# Patient Record
Sex: MALE | Race: BLACK OR AFRICAN AMERICAN | NOT HISPANIC OR LATINO | Employment: UNEMPLOYED | URBAN - METROPOLITAN AREA
[De-identification: names, ages, dates, MRNs, and addresses within clinical notes are randomized per-mention and may not be internally consistent; named-entity substitution may affect disease eponyms.]

---

## 2017-07-20 ENCOUNTER — HOSPITAL ENCOUNTER (EMERGENCY)
Facility: HOSPITAL | Age: 32
Discharge: HOME | End: 2017-07-20
Attending: EMERGENCY MEDICINE
Payer: COMMERCIAL

## 2017-07-20 VITALS
DIASTOLIC BLOOD PRESSURE: 72 MMHG | HEIGHT: 73 IN | OXYGEN SATURATION: 98 % | WEIGHT: 225 LBS | TEMPERATURE: 98 F | RESPIRATION RATE: 16 BRPM | HEART RATE: 86 BPM | SYSTOLIC BLOOD PRESSURE: 115 MMHG | BODY MASS INDEX: 29.82 KG/M2
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
## 2017-07-20 DIAGNOSIS — S80.01XA CONTUSION OF RIGHT KNEE, INITIAL ENCOUNTER: Primary | ICD-10-CM

## 2017-07-20 PROCEDURE — 99284 EMERGENCY DEPT VISIT MOD MDM: CPT | Mod: 25

## 2017-07-20 PROCEDURE — 25000003 PHARM REV CODE 250: Performed by: NURSE PRACTITIONER

## 2017-07-20 RX ORDER — NAPROXEN 500 MG/1
500 TABLET ORAL
Status: COMPLETED | OUTPATIENT
Start: 2017-07-20 | End: 2017-07-20

## 2017-07-20 RX ORDER — NAPROXEN 500 MG/1
500 TABLET ORAL 2 TIMES DAILY PRN
Qty: 10 TABLET | Refills: 0 | Status: SHIPPED | OUTPATIENT
Start: 2017-07-20 | End: 2017-07-25

## 2017-07-20 RX ORDER — METHOCARBAMOL 500 MG/1
1000 TABLET, FILM COATED ORAL 3 TIMES DAILY PRN
Qty: 18 TABLET | Refills: 0 | OUTPATIENT
Start: 2017-07-20 | End: 2018-12-11

## 2017-07-20 RX ADMIN — NAPROXEN 500 MG: 500 TABLET ORAL at 01:07

## 2017-07-20 NOTE — DISCHARGE INSTRUCTIONS
Please return to the Emergency Department for any new or worsening symptoms including: worsening knee pain, fever, chest pain, shortness of breath, loss of consciousness, dizziness, weakness, or any other concerns.     Please follow up with your Primary Care Provider within in the week. If you do not have a Primary Care Provider, you may contact the one listed on your discharge paperwork or you may also call the [facility] Appointment Desk at [phone] to schedule an appointment with a Primary Care Provider.     Please take all medication as prescribed. You have been prescribed Robaxin for pain. Please do not take this medication while working, drinking alcohol, swimming, or while driving/operating heavy machinery. This medication may cause drowsiness, impair judgment, and reduce physical capabilities.    You have been prescribed Naproxen for pain. This is an Non-Steroidal Anti-Inflammatory (NSAID) Medication. Please do not take any additional NSAIDs while you are taking this medication including (Advil, Aleve, Motrin, Ibuprofen, Mobic\meloxicam, Naprosyn, etc.). Please stop taking this medication if you experience: weakness, itching, yellow skin or eyes, joint pains, vomiting blood, blood or black stools, unusual weight gain, or swelling in your arms, legs, hands, or feet.     Rest, Ice, Elevate the knee to help with pain.
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
## 2018-03-22 ENCOUNTER — HOSPITAL ENCOUNTER (EMERGENCY)
Facility: HOSPITAL | Age: 33
Discharge: HOME | End: 2018-03-22
Attending: EMERGENCY MEDICINE
Payer: MEDICAID

## 2018-03-22 VITALS
WEIGHT: 225 LBS | OXYGEN SATURATION: 100 % | SYSTOLIC BLOOD PRESSURE: 124 MMHG | TEMPERATURE: 98 F | BODY MASS INDEX: 29.82 KG/M2 | RESPIRATION RATE: 16 BRPM | DIASTOLIC BLOOD PRESSURE: 86 MMHG | HEART RATE: 86 BPM | HEIGHT: 73 IN

## 2018-03-22 DIAGNOSIS — J06.9 VIRAL URI WITH COUGH: Primary | ICD-10-CM

## 2018-03-22 DIAGNOSIS — J11.1 INFLUENZA-LIKE ILLNESS: ICD-10-CM

## 2018-03-22 PROCEDURE — 25000003 PHARM REV CODE 250: Performed by: PHYSICIAN ASSISTANT

## 2018-03-22 PROCEDURE — 99283 EMERGENCY DEPT VISIT LOW MDM: CPT

## 2018-03-22 RX ORDER — OSELTAMIVIR PHOSPHATE 75 MG/1
75 CAPSULE ORAL 2 TIMES DAILY
Qty: 10 CAPSULE | Refills: 0 | Status: SHIPPED | OUTPATIENT
Start: 2018-03-22 | End: 2018-03-22

## 2018-03-22 RX ORDER — BENZONATATE 100 MG/1
100 CAPSULE ORAL 3 TIMES DAILY PRN
Qty: 20 CAPSULE | Refills: 0 | Status: SHIPPED | OUTPATIENT
Start: 2018-03-22 | End: 2018-04-01

## 2018-03-22 RX ORDER — OSELTAMIVIR PHOSPHATE 75 MG/1
75 CAPSULE ORAL 2 TIMES DAILY
Qty: 10 CAPSULE | Refills: 0 | Status: SHIPPED | OUTPATIENT
Start: 2018-03-22 | End: 2018-03-27

## 2018-03-22 RX ORDER — IBUPROFEN 600 MG/1
600 TABLET ORAL EVERY 6 HOURS PRN
Qty: 20 TABLET | Refills: 0 | OUTPATIENT
Start: 2018-03-22 | End: 2018-12-11

## 2018-03-22 RX ORDER — BENZONATATE 100 MG/1
100 CAPSULE ORAL 3 TIMES DAILY PRN
Qty: 20 CAPSULE | Refills: 0 | Status: SHIPPED | OUTPATIENT
Start: 2018-03-22 | End: 2018-03-22

## 2018-03-22 RX ORDER — IBUPROFEN 600 MG/1
600 TABLET ORAL EVERY 6 HOURS PRN
Qty: 20 TABLET | Refills: 0 | Status: SHIPPED | OUTPATIENT
Start: 2018-03-22 | End: 2018-03-22

## 2018-03-22 RX ORDER — BENZONATATE 100 MG/1
100 CAPSULE ORAL
Status: COMPLETED | OUTPATIENT
Start: 2018-03-22 | End: 2018-03-22

## 2018-03-22 RX ORDER — IBUPROFEN 600 MG/1
600 TABLET ORAL
Status: COMPLETED | OUTPATIENT
Start: 2018-03-22 | End: 2018-03-22

## 2018-03-22 RX ADMIN — IBUPROFEN 600 MG: 600 TABLET, FILM COATED ORAL at 06:03

## 2018-03-22 RX ADMIN — BENZONATATE 100 MG: 100 CAPSULE ORAL at 06:03

## 2018-03-22 NOTE — ED PROVIDER NOTES
Encounter Date: 3/22/2018       History     Chief Complaint   Patient presents with    Headache     Headache and middle to lower back pain x 2 days.    Back Pain     33 y/o male with no medical hx presents with cough, rhinorrhea, headache, and myalgias x2-3 days. He explains it started with a mild cough. He also reports feeling chills at times, but has not taken his temp. His headache is frontal, constant, and non-radiating. He denies neck stiffness, vision changes, N/V, SOB, or CP.           Review of patient's allergies indicates:  No Known Allergies  History reviewed. No pertinent past medical history.  No past surgical history on file.  Family History   Problem Relation Age of Onset    COPD Neg Hx     Drug abuse Neg Hx     Hyperlipidemia Neg Hx     Mental retardation Neg Hx      Social History   Substance Use Topics    Smoking status: Current Every Day Smoker     Packs/day: 0.25     Types: Cigarettes    Smokeless tobacco: Never Used    Alcohol use Yes      Comment: sometimes     Review of Systems   Constitutional: Positive for chills. Negative for fever.   HENT: Positive for rhinorrhea. Negative for sore throat.    Eyes: Negative for photophobia and visual disturbance.   Respiratory: Positive for cough. Negative for shortness of breath.    Cardiovascular: Negative for chest pain.   Gastrointestinal: Negative for nausea.   Genitourinary: Negative for dysuria.   Musculoskeletal: Positive for myalgias. Negative for back pain.   Skin: Negative for rash.   Neurological: Positive for headaches. Negative for weakness.   Hematological: Does not bruise/bleed easily.       Physical Exam     Initial Vitals [03/22/18 1735]   BP Pulse Resp Temp SpO2   (!) 143/95 81 18 98.1 °F (36.7 °C) 97 %      MAP       111         Physical Exam    Vitals reviewed.  Constitutional: He appears well-developed and well-nourished. He is not diaphoretic. No distress.   HENT:   Head: Normocephalic and atraumatic.   Right Ear: External  ear normal.   Left Ear: External ear normal.   Nose: Nose normal.   Mouth/Throat: Oropharynx is clear and moist. No oropharyngeal exudate.   Eyes: Conjunctivae are normal. No scleral icterus.   Neck: Normal range of motion. Neck supple. No JVD present.   Cardiovascular: Normal rate, regular rhythm, normal heart sounds and intact distal pulses.   Pulmonary/Chest: Breath sounds normal. No respiratory distress. He has no wheezes. He has no rhonchi. He has no rales. He exhibits no tenderness.   Abdominal: Soft. He exhibits no distension and no mass. There is no tenderness. There is no rebound and no guarding.   Musculoskeletal: Normal range of motion.   Lymphadenopathy:     He has no cervical adenopathy.   Neurological: He is alert and oriented to person, place, and time.   Skin: Skin is warm and dry. No rash noted. No erythema.         ED Course   Procedures  Labs Reviewed - No data to display          Medical Decision Making:   Initial Assessment:   31 y/o male with URI symptoms x2 days. No CP, SOB, neck stiffness. He presents well appearing, afebrile, with clear lungs. No meningeal signs.   Differential Diagnosis:   Viral URI, allergic rhinitis, and also considered but less likely pneumonia, bronchitis, sepsis.  ED Management:  Pt treated with supportive care and offered Tamiflu with risks and benefits explained. He verbalized understanding and agreed with plan. Return precautions given.               Attending Attestation:     Physician Attestation Statement for NP/PA:   I discussed this assessment and plan of this patient with the NP/PA, but I did not personally examine the patient. The face to face encounter was performed by the NP/PA.                     Clinical Impression:   The primary encounter diagnosis was Viral URI with cough. A diagnosis of Influenza-like illness was also pertinent to this visit.                           Tiburcio Carroll PA-C  03/22/18 3774

## 2018-12-10 ENCOUNTER — HOSPITAL ENCOUNTER (EMERGENCY)
Facility: HOSPITAL | Age: 33
Discharge: HOME | End: 2018-12-11
Attending: EMERGENCY MEDICINE
Payer: MEDICAID

## 2018-12-10 DIAGNOSIS — K04.7 DENTAL ABSCESS: Primary | ICD-10-CM

## 2018-12-10 PROCEDURE — 99284 EMERGENCY DEPT VISIT MOD MDM: CPT

## 2018-12-10 PROCEDURE — 25000003 PHARM REV CODE 250: Performed by: NURSE PRACTITIONER

## 2018-12-10 PROCEDURE — S0077 INJECTION, CLINDAMYCIN PHOSP: HCPCS | Performed by: NURSE PRACTITIONER

## 2018-12-10 PROCEDURE — 96372 THER/PROPH/DIAG INJ SC/IM: CPT | Mod: 25

## 2018-12-10 RX ORDER — CLINDAMYCIN PHOSPHATE 150 MG/ML
600 INJECTION, SOLUTION INTRAVENOUS
Status: COMPLETED | OUTPATIENT
Start: 2018-12-10 | End: 2018-12-10

## 2018-12-10 RX ORDER — IBUPROFEN 400 MG/1
800 TABLET ORAL
Status: COMPLETED | OUTPATIENT
Start: 2018-12-10 | End: 2018-12-10

## 2018-12-10 RX ORDER — CLINDAMYCIN HYDROCHLORIDE 150 MG/1
300 CAPSULE ORAL 4 TIMES DAILY
Qty: 56 CAPSULE | Refills: 0 | Status: SHIPPED | OUTPATIENT
Start: 2018-12-10 | End: 2018-12-11 | Stop reason: SDUPTHER

## 2018-12-10 RX ADMIN — CLINDAMYCIN PHOSPHATE 600 MG: 150 INJECTION, SOLUTION INTRAMUSCULAR; INTRAVENOUS at 11:12

## 2018-12-10 RX ADMIN — IBUPROFEN 800 MG: 400 TABLET, FILM COATED ORAL at 11:12

## 2018-12-11 VITALS
OXYGEN SATURATION: 100 % | WEIGHT: 230 LBS | DIASTOLIC BLOOD PRESSURE: 81 MMHG | TEMPERATURE: 98 F | HEART RATE: 70 BPM | SYSTOLIC BLOOD PRESSURE: 131 MMHG | RESPIRATION RATE: 18 BRPM | BODY MASS INDEX: 30.48 KG/M2 | HEIGHT: 73 IN

## 2018-12-11 RX ORDER — IBUPROFEN 800 MG/1
800 TABLET ORAL EVERY 6 HOURS PRN
Qty: 20 TABLET | Refills: 0 | Status: SHIPPED | OUTPATIENT
Start: 2018-12-11 | End: 2019-10-16

## 2018-12-11 RX ORDER — CHLORHEXIDINE GLUCONATE ORAL RINSE 1.2 MG/ML
15 SOLUTION DENTAL 2 TIMES DAILY
Qty: 420 ML | Refills: 0 | Status: SHIPPED | OUTPATIENT
Start: 2018-12-11 | End: 2018-12-25

## 2018-12-11 RX ORDER — CLINDAMYCIN HYDROCHLORIDE 150 MG/1
300 CAPSULE ORAL 4 TIMES DAILY
Qty: 56 CAPSULE | Refills: 0 | Status: SHIPPED | OUTPATIENT
Start: 2018-12-11 | End: 2018-12-18

## 2018-12-11 NOTE — ED PROVIDER NOTES
Encounter Date: 12/10/2018       History     Chief Complaint   Patient presents with    Dental Pain     Pt reports back teeth on both top and bottom of R side of mouth hurt. Pt says pain for 2 days.     33-year-old male which presents with right upper dental pain that began 3 days ago.  Patient states that his filling fell out and he has not contacted the dentist.      The history is provided by the patient.     Review of patient's allergies indicates:  No Known Allergies  No past medical history on file.  No past surgical history on file.  Family History   Problem Relation Age of Onset    COPD Neg Hx     Drug abuse Neg Hx     Hyperlipidemia Neg Hx     Mental retardation Neg Hx      Social History     Tobacco Use    Smoking status: Current Every Day Smoker     Packs/day: 0.25     Types: Cigarettes    Smokeless tobacco: Never Used   Substance Use Topics    Alcohol use: Yes     Comment: sometimes    Drug use: No     Review of Systems   Constitutional: Negative for chills and fever.   HENT: Positive for dental problem and ear pain. Negative for sore throat.    Respiratory: Negative for chest tightness and shortness of breath.    Cardiovascular: Negative for chest pain.   Gastrointestinal: Negative for abdominal pain, nausea and vomiting.   Genitourinary: Negative for dysuria.   Musculoskeletal: Negative for back pain and myalgias.   Skin: Negative for color change and rash.   Neurological: Negative for dizziness, weakness, light-headedness and headaches.   Hematological: Does not bruise/bleed easily.   All other systems reviewed and are negative.      Physical Exam     Initial Vitals [12/10/18 2300]   BP Pulse Resp Temp SpO2   (!) 130/92 66 18 97.7 °F (36.5 °C) 99 %      MAP       --         Physical Exam    Nursing note and vitals reviewed.  Constitutional: He appears well-developed and well-nourished.   HENT:   Head: Normocephalic and atraumatic.   Right Ear: External ear normal.   Left Ear: External ear  normal.   Nose: Nose normal.   Mouth/Throat: Uvula is midline and oropharynx is clear and moist. No trismus in the jaw. Abnormal dentition. Dental abscesses and dental caries present.       Eyes: Conjunctivae and EOM are normal. Pupils are equal, round, and reactive to light.   Neck: Normal range of motion. No tracheal deviation present.   Cardiovascular: Normal rate, regular rhythm, normal heart sounds and intact distal pulses. Exam reveals no gallop and no friction rub.    No murmur heard.  Pulmonary/Chest: Breath sounds normal. No respiratory distress. He has no wheezes. He has no rhonchi. He has no rales. He exhibits no tenderness.   Musculoskeletal: Normal range of motion. He exhibits no edema or tenderness.   Lymphadenopathy:     He has no cervical adenopathy.   Neurological: He is alert and oriented to person, place, and time. He has normal strength. GCS score is 15. GCS eye subscore is 4. GCS verbal subscore is 5. GCS motor subscore is 6.       ED Course   Procedures  Labs Reviewed - No data to display        Medical Decision Making:   Initial Assessment:   33-year-old male which presents with right upper posterior dental pain that began 3 days ago.  Patient states that he has not taken any medication for the pain and he denies fever.  Patient thinks that is more than just the filling falling out.  Differential Diagnosis:   Dental abscess, dental caries, poor dental hygiene  ED Management:  Patient examined.  Small dental abscess noted to the posterior aspect of the right upper posterior molar.  Moderate erythema noted to the area without fluctuance.  Patient given 600 mg of clindamycin IM.  Patient discharged with clindamycin and instructions to follow up with a dentist in by the 7 days.  Patient given strict return precautions and voiced understanding of all discharge instructions.  Patient was stable at discharge                   ED Course as of Dec 10 2328   Mon Dec 10, 2018   2321 BP: (!) 130/92 [AT]    2321 Temp: 97.7 °F (36.5 °C) [AT]   2321 Temp src: Oral [AT]   2321 Pulse: 66 [AT]   2321 Resp: 18 [AT]   2321 SpO2: 99 % [AT]      ED Course User Index  [AT] MADELAINE Hurst     Clinical Impression:   The encounter diagnosis was Dental abscess.                             MADELAINE Hurst  12/11/18 0307

## 2018-12-11 NOTE — ED NOTES
Pt given PO med and IM antibiotic. Pt awaiting shot time. Will continue to monitor. NAD noted at this time.

## 2018-12-11 NOTE — ED TRIAGE NOTES
Pt say he has a toothache in his back teeth on the R side both top and bottom teeth. Pain started 2 days ago.

## 2018-12-11 NOTE — ED NOTES
No redness or swelling noted to IM site. NAD noted at this time. Pt tolerated abx well. Discharge and follow up instructions discussed and pt verbalized understanding of need of dental follow up. Pt discharged to home with RR easy even and full.

## 2019-03-28 ENCOUNTER — HOSPITAL ENCOUNTER (EMERGENCY)
Facility: HOSPITAL | Age: 34
Discharge: HOME | End: 2019-03-28
Attending: EMERGENCY MEDICINE
Payer: MEDICAID

## 2019-03-28 VITALS
OXYGEN SATURATION: 99 % | TEMPERATURE: 98 F | RESPIRATION RATE: 15 BRPM | HEART RATE: 92 BPM | WEIGHT: 215 LBS | BODY MASS INDEX: 28.49 KG/M2 | HEIGHT: 73 IN | SYSTOLIC BLOOD PRESSURE: 118 MMHG | DIASTOLIC BLOOD PRESSURE: 79 MMHG

## 2019-03-28 DIAGNOSIS — K02.9 PAIN DUE TO DENTAL CARIES: Primary | ICD-10-CM

## 2019-03-28 PROCEDURE — 99284 EMERGENCY DEPT VISIT MOD MDM: CPT

## 2019-03-28 PROCEDURE — 25000003 PHARM REV CODE 250: Performed by: EMERGENCY MEDICINE

## 2019-03-28 RX ORDER — AMOXICILLIN 500 MG/1
1000 CAPSULE ORAL EVERY 12 HOURS
Qty: 40 CAPSULE | Refills: 0 | Status: SHIPPED | OUTPATIENT
Start: 2019-03-28 | End: 2019-04-17

## 2019-03-28 RX ORDER — TRAMADOL HYDROCHLORIDE 50 MG/1
50 TABLET ORAL EVERY 6 HOURS PRN
Qty: 10 TABLET | Refills: 0 | Status: SHIPPED | OUTPATIENT
Start: 2019-03-28 | End: 2019-04-07

## 2019-03-28 RX ORDER — TRAMADOL HYDROCHLORIDE 50 MG/1
50 TABLET ORAL
Status: DISCONTINUED | OUTPATIENT
Start: 2019-03-28 | End: 2019-03-28 | Stop reason: HOSPADM

## 2019-03-28 RX ORDER — AMOXICILLIN 250 MG/1
1000 CAPSULE ORAL
Status: COMPLETED | OUTPATIENT
Start: 2019-03-28 | End: 2019-03-28

## 2019-03-28 RX ADMIN — AMOXICILLIN 1000 MG: 250 CAPSULE ORAL at 07:03

## 2019-03-28 NOTE — ED PROVIDER NOTES
Encounter Date: 3/28/2019       History     Chief Complaint   Patient presents with    Dental Pain     upper right, reports his tooth is broken; denies having a dentist     HPI   This 33-year-old male presents to the emergency room with a 1 month history of pain in a right lower molar.  He is not allergic to any medicines.  He has some associated headache and right ear pain. The pain is severe and constant.  There are no other associated symptoms.  Review of patient's allergies indicates:  No Known Allergies  History reviewed. No pertinent past medical history.  History reviewed. No pertinent surgical history.  Family History   Problem Relation Age of Onset    COPD Neg Hx     Drug abuse Neg Hx     Hyperlipidemia Neg Hx     Mental retardation Neg Hx      Social History     Tobacco Use    Smoking status: Current Every Day Smoker     Packs/day: 0.25    Smokeless tobacco: Never Used   Substance Use Topics    Alcohol use: No     Frequency: Never     Comment: sometimes    Drug use: Yes     Types: Marijuana     Comment: daily     Review of Systems  The patient was questioned specifically with regard to the following.  General: Fever, chills, sweats. Neuro: Headache. Eyes: eye problems. ENT: Ear pain, sore throat. Cardiovascular: Chest pain. Respiratory: Cough, shortness of breath. Gastrointestinal: Abdominal pain, vomiting, diarrhea. Genitourinary: Painful urination.  Musculoskeletal: Arm and leg problems. Skin: Rash.  The review of systems was negative except for the following:  Painful right lower molar  Physical Exam     Initial Vitals [03/28/19 0627]   BP Pulse Resp Temp SpO2   118/79 92 15 97.6 °F (36.4 °C) 99 %      MAP       --         Physical Exam  The patient was examined specifically for the following:   General:No significant distress, Good color, Warm and dry. Head and neck:Scalp atraumatic, Neck supple. Neurological:Appropriate conversation, Gross motor deficits. Eyes:Conjugate gaze, Clear corneas.  ENT: No epistaxis. Cardiac: Regular rate and rhythm, Grossly normal heart tones. Pulmonary: Wheezing, Rales. Gastrointestinal: Abdominal tenderness, Abdominal distention. Musculoskeletal: Extremity deformity, Apparent pain with range of motion of the joints. Skin: Rash.   The findings on examination were normal except for the following:  Patient has a tender carious right lower 3rd molar.  There is no facial edema trismus or dysphagia  ED Course   Procedures  Labs Reviewed - No data to display       Imaging Results    None       Medical decision making:  Given the above this patient has dental caries with pain.  There is no evidence of facial cellulitis or abscess.  I will treat the patient with amoxicillin and tramadol and have him follow up with dentistry.                          Clinical Impression:       ICD-10-CM ICD-9-CM   1. Pain due to dental caries K02.9 521.00                                Cedric Car MD  03/28/19 0650

## 2019-03-28 NOTE — ED TRIAGE NOTES
"Pt presents to ER with c/o right lower dental pain (broken tooth) x 3 days. Pt has been taking Tylenol 500 mg tablets w/o relief. Currently rates "thumping" pain 10/10. Awaiting provider. Will continue to monitor. No past medical history on file. No past surgical history on file.  "

## 2019-03-28 NOTE — DISCHARGE INSTRUCTIONS
Please follow-up with a dentist as soon as possible.  Please return immediately if you get worse or if new problems develop.  Amoxicillin as directed.  You may continue ibuprofen for pain.  Tramadol for pain.

## 2019-05-16 ENCOUNTER — HOSPITAL ENCOUNTER (EMERGENCY)
Facility: HOSPITAL | Age: 34
Discharge: HOME | End: 2019-05-16
Attending: EMERGENCY MEDICINE

## 2019-05-16 VITALS
WEIGHT: 225 LBS | BODY MASS INDEX: 30.48 KG/M2 | RESPIRATION RATE: 18 BRPM | DIASTOLIC BLOOD PRESSURE: 89 MMHG | HEIGHT: 72 IN | SYSTOLIC BLOOD PRESSURE: 143 MMHG | TEMPERATURE: 98 F | OXYGEN SATURATION: 100 % | HEART RATE: 59 BPM

## 2019-05-16 DIAGNOSIS — E86.0 DEHYDRATION: Primary | ICD-10-CM

## 2019-05-16 DIAGNOSIS — R42 DIZZINESS: ICD-10-CM

## 2019-05-16 LAB
ALBUMIN SERPL BCP-MCNC: 4 G/DL (ref 3.5–5.2)
ALP SERPL-CCNC: 64 U/L (ref 55–135)
ALT SERPL W/O P-5'-P-CCNC: 16 U/L (ref 10–44)
ANION GAP SERPL CALC-SCNC: 7 MMOL/L (ref 8–16)
AST SERPL-CCNC: 19 U/L (ref 10–40)
BACTERIA #/AREA URNS HPF: ABNORMAL /HPF
BASOPHILS # BLD AUTO: 0.03 K/UL (ref 0–0.2)
BASOPHILS NFR BLD: 0.3 % (ref 0–1.9)
BILIRUB SERPL-MCNC: 0.3 MG/DL (ref 0.1–1)
BILIRUB UR QL STRIP: ABNORMAL
BUN SERPL-MCNC: 18 MG/DL (ref 6–20)
CALCIUM SERPL-MCNC: 9.1 MG/DL (ref 8.7–10.5)
CHLORIDE SERPL-SCNC: 108 MMOL/L (ref 95–110)
CK SERPL-CCNC: 291 U/L (ref 20–200)
CLARITY UR: CLEAR
CO2 SERPL-SCNC: 27 MMOL/L (ref 23–29)
COLOR UR: YELLOW
CREAT SERPL-MCNC: 1 MG/DL (ref 0.5–1.4)
DIFFERENTIAL METHOD: NORMAL
EOSINOPHIL # BLD AUTO: 0.3 K/UL (ref 0–0.5)
EOSINOPHIL NFR BLD: 3.4 % (ref 0–8)
ERYTHROCYTE [DISTWIDTH] IN BLOOD BY AUTOMATED COUNT: 13.6 % (ref 11.5–14.5)
EST. GFR  (AFRICAN AMERICAN): >60 ML/MIN/1.73 M^2
EST. GFR  (NON AFRICAN AMERICAN): >60 ML/MIN/1.73 M^2
GLUCOSE SERPL-MCNC: 90 MG/DL (ref 70–110)
GLUCOSE UR QL STRIP: NEGATIVE
HCT VFR BLD AUTO: 42.8 % (ref 40–54)
HGB BLD-MCNC: 14.1 G/DL (ref 14–18)
HGB UR QL STRIP: NEGATIVE
HYALINE CASTS #/AREA URNS LPF: 0 /LPF
KETONES UR QL STRIP: ABNORMAL
LEUKOCYTE ESTERASE UR QL STRIP: ABNORMAL
LYMPHOCYTES # BLD AUTO: 2.3 K/UL (ref 1–4.8)
LYMPHOCYTES NFR BLD: 26.8 % (ref 18–48)
MCH RBC QN AUTO: 29.7 PG (ref 27–31)
MCHC RBC AUTO-ENTMCNC: 32.9 G/DL (ref 32–36)
MCV RBC AUTO: 90 FL (ref 82–98)
MICROSCOPIC COMMENT: ABNORMAL
MONOCYTES # BLD AUTO: 0.4 K/UL (ref 0.3–1)
MONOCYTES NFR BLD: 4.8 % (ref 4–15)
NEUTROPHILS # BLD AUTO: 5.6 K/UL (ref 1.8–7.7)
NEUTROPHILS NFR BLD: 64.7 % (ref 38–73)
NITRITE UR QL STRIP: NEGATIVE
PH UR STRIP: 5 [PH] (ref 5–8)
PLATELET # BLD AUTO: 296 K/UL (ref 150–350)
PMV BLD AUTO: 10.3 FL (ref 9.2–12.9)
POTASSIUM SERPL-SCNC: 3.5 MMOL/L (ref 3.5–5.1)
PROT SERPL-MCNC: 6.8 G/DL (ref 6–8.4)
PROT UR QL STRIP: ABNORMAL
RBC # BLD AUTO: 4.75 M/UL (ref 4.6–6.2)
RBC #/AREA URNS HPF: 0 /HPF (ref 0–4)
SODIUM SERPL-SCNC: 142 MMOL/L (ref 136–145)
SP GR UR STRIP: >1.06 (ref 1–1.03)
SQUAMOUS #/AREA URNS HPF: 1 /HPF
URN SPEC COLLECT METH UR: ABNORMAL
UROBILINOGEN UR STRIP-ACNC: NEGATIVE EU/DL
WBC # BLD AUTO: 8.59 K/UL (ref 3.9–12.7)
WBC #/AREA URNS HPF: 6 /HPF (ref 0–5)

## 2019-05-16 PROCEDURE — 25000003 PHARM REV CODE 250: Performed by: PHYSICIAN ASSISTANT

## 2019-05-16 PROCEDURE — 99284 EMERGENCY DEPT VISIT MOD MDM: CPT | Mod: 25

## 2019-05-16 PROCEDURE — 93005 ELECTROCARDIOGRAM TRACING: CPT

## 2019-05-16 PROCEDURE — 93010 ELECTROCARDIOGRAM REPORT: CPT | Mod: ,,, | Performed by: INTERNAL MEDICINE

## 2019-05-16 PROCEDURE — 82550 ASSAY OF CK (CPK): CPT

## 2019-05-16 PROCEDURE — 96361 HYDRATE IV INFUSION ADD-ON: CPT

## 2019-05-16 PROCEDURE — 96375 TX/PRO/DX INJ NEW DRUG ADDON: CPT

## 2019-05-16 PROCEDURE — 81000 URINALYSIS NONAUTO W/SCOPE: CPT

## 2019-05-16 PROCEDURE — 80053 COMPREHEN METABOLIC PANEL: CPT

## 2019-05-16 PROCEDURE — 85025 COMPLETE CBC W/AUTO DIFF WBC: CPT

## 2019-05-16 PROCEDURE — 96374 THER/PROPH/DIAG INJ IV PUSH: CPT

## 2019-05-16 PROCEDURE — 93010 EKG 12-LEAD: ICD-10-PCS | Mod: ,,, | Performed by: INTERNAL MEDICINE

## 2019-05-16 PROCEDURE — 63600175 PHARM REV CODE 636 W HCPCS: Performed by: PHYSICIAN ASSISTANT

## 2019-05-16 RX ORDER — DEXTROAMPHETAMINE SACCHARATE, AMPHETAMINE ASPARTATE MONOHYDRATE, DEXTROAMPHETAMINE SULFATE AND AMPHETAMINE SULFATE 6.25; 6.25; 6.25; 6.25 MG/1; MG/1; MG/1; MG/1
25 CAPSULE, EXTENDED RELEASE ORAL EVERY MORNING
COMMUNITY
End: 2019-10-16

## 2019-05-16 RX ORDER — ONDANSETRON 2 MG/ML
4 INJECTION INTRAMUSCULAR; INTRAVENOUS
Status: COMPLETED | OUTPATIENT
Start: 2019-05-16 | End: 2019-05-16

## 2019-05-16 RX ORDER — KETOROLAC TROMETHAMINE 30 MG/ML
15 INJECTION, SOLUTION INTRAMUSCULAR; INTRAVENOUS
Status: COMPLETED | OUTPATIENT
Start: 2019-05-16 | End: 2019-05-16

## 2019-05-16 RX ADMIN — ONDANSETRON 4 MG: 2 INJECTION INTRAMUSCULAR; INTRAVENOUS at 01:05

## 2019-05-16 RX ADMIN — KETOROLAC TROMETHAMINE 15 MG: 30 INJECTION, SOLUTION INTRAMUSCULAR; INTRAVENOUS at 01:05

## 2019-05-16 RX ADMIN — SODIUM CHLORIDE 1000 ML: 0.9 INJECTION, SOLUTION INTRAVENOUS at 01:05

## 2019-05-16 RX ADMIN — SODIUM CHLORIDE 1000 ML: 0.9 INJECTION, SOLUTION INTRAVENOUS at 03:05

## 2019-05-16 NOTE — ED PROVIDER NOTES
"Encounter Date: 5/16/2019  This is a SORT/MSE of a 33 y.o. 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 Care will be transferred to an alternate provider when patient is roomed for a full evaluation and final disposition. Patient is aware that he/she is awaiting a room in the emergency department, where another provider will review results, evaluate and treat as needed. MAGDA Drew DNP  SCRIBE #1 NOTE: I, Josefina Velasquez, am scribing for, and in the presence of,  Tiburcio Carroll. I have scribed the entire note.       History     Chief Complaint   Patient presents with    Dizziness     " I was outside and started feeling dizzy and vomiting".      CC: Dizziness    HPI:  This is a 33 y.o. male with no pertinent PMHx who presents to the Emergency Department with a cc of dizziness. The dizziness began suddenly 4 hours ago while he was working outside. Since onset, his dizziness has been intermittent, but elicited by standing. Associated symptoms include fatigue, nausea, 5 episodes of vomiting, and mild headache. He denies fever, abdominal pain, diarrhea, blood in stool, hematuria, or neck pain.  Symptoms are worsened by standing and somewhat better while at rest. He states he has been working outside for 6 days in a row and drinking water. He reports no prior history of similar symptoms. NKDA        The history is provided by the patient.     Review of patient's allergies indicates:  No Known Allergies  Past Medical History:   Diagnosis Date    ADHD      History reviewed. No pertinent surgical history.  Family History   Problem Relation Age of Onset    COPD Neg Hx     Drug abuse Neg Hx     Hyperlipidemia Neg Hx     Mental retardation Neg Hx      Social History     Tobacco Use    Smoking status: Current Every Day Smoker     Packs/day: 0.25     Types: Cigarettes    Smokeless tobacco: Never Used   Substance Use Topics    Alcohol use: No     Frequency: " Never    Drug use: Yes     Types: Marijuana     Comment: rarely     Review of Systems   Constitutional: Positive for fatigue. Negative for fever.   HENT: Negative for sore throat.    Respiratory: Negative for shortness of breath.    Cardiovascular: Negative for chest pain.   Gastrointestinal: Positive for nausea and vomiting. Negative for abdominal pain, blood in stool and diarrhea.   Genitourinary: Negative for dysuria and hematuria.   Musculoskeletal: Negative for back pain.   Skin: Negative for rash.   Neurological: Positive for dizziness and headaches. Negative for weakness.   Hematological: Does not bruise/bleed easily.   All other systems reviewed and are negative.      Physical Exam     Initial Vitals [05/16/19 1307]   BP Pulse Resp Temp SpO2   117/71 68 18 97.7 °F (36.5 °C) 99 %      MAP       --         Physical Exam    Nursing note and vitals reviewed.  Constitutional: He appears well-developed and well-nourished. He is not diaphoretic. No distress.   HENT:   Head: Normocephalic and atraumatic.   Mouth/Throat: Oropharynx is clear and moist.   Eyes: EOM are normal. Pupils are equal, round, and reactive to light.   Neck: No tracheal deviation present.   Cardiovascular: Normal rate, regular rhythm, normal heart sounds and intact distal pulses.   Pulmonary/Chest: Breath sounds normal. No stridor. No respiratory distress.   Abdominal: Soft. He exhibits no distension and no mass. There is no tenderness.   Musculoskeletal: Normal range of motion. He exhibits no edema.   Neurological: He is alert and oriented to person, place, and time. No cranial nerve deficit or sensory deficit.   Skin: Skin is warm and dry. Capillary refill takes less than 2 seconds. No rash noted.   Psychiatric: He has a normal mood and affect. His behavior is normal. Thought content normal.         ED Course   Procedures  Labs Reviewed   COMPREHENSIVE METABOLIC PANEL - Abnormal; Notable for the following components:       Result Value     Anion Gap 7 (*)     All other components within normal limits   CK - Abnormal; Notable for the following components:     (*)     All other components within normal limits   URINALYSIS, REFLEX TO URINE CULTURE - Abnormal; Notable for the following components:    Protein, UA 1+ (*)     Ketones, UA Trace (*)     Bilirubin (UA) 1+ (*)     Leukocytes, UA Trace (*)     All other components within normal limits    Narrative:     Preferred Collection Type->Urine, Clean Catch   URINALYSIS MICROSCOPIC - Abnormal; Notable for the following components:    WBC, UA 6 (*)     All other components within normal limits    Narrative:     Preferred Collection Type->Urine, Clean Catch   CBC W/ AUTO DIFFERENTIAL     EKG Readings: (Independently Interpreted)   Initial Reading: No STEMI. Rhythm: Sinus Bradycardia. Ectopy: No Ectopy. Conduction: Normal. ST Segments: Normal ST Segments. T Waves: Normal. Axis: Normal.        ECG Results          EKG 12-lead (In process)  Result time 05/16/19 14:39:59    In process by Interface, Lab In [facility] (05/16/19 14:39:59)                 Narrative:    Test Reason : R42,    Vent. Rate : 056 BPM     Atrial Rate : 056 BPM     P-R Int : 168 ms          QRS Dur : 096 ms      QT Int : 386 ms       P-R-T Axes : 056 066 048 degrees     QTc Int : 372 ms    Program found technically poor ECG  Sinus bradycardia  Otherwise normal ECG  No previous ECGs available    Referred By: [email or web address]   SELF           Confirmed By:                   In process by Interface, Lab In [facility] (05/16/19 14:39:12)                 Narrative:    Test Reason : R42,    Vent. Rate : 056 BPM     Atrial Rate : 056 BPM     P-R Int : 168 ms          QRS Dur : 096 ms      QT Int : 386 ms       P-R-T Axes : 056 066 048 degrees     QTc Int : 372 ms    Program found technically poor ECG  Sinus bradycardia  Otherwise normal ECG  No previous ECGs available    Referred By: AAAREFERR   SELF           Confirmed By:                              Imaging Results    None          Medical Decision Making:   Initial Assessment:   Johan Kaufman presents to the emergency department today complaining of dizziness. Exam unremarkable.  Will go ahead and obtain labs, EKG, treat symptoms and reassess.    Differential Diagnosis:   Differential Diagnosis includes, but is not limited to:  Peripheral vertigo (labyrinthitis, vestibular neuritis, BPPV, Meniere's disease), cerebellar stroke/CVA, TIA, SAH, carotid artery dissection, intracranial mass, medication reaction/noncompliance, substance abuse, electrolyte abnormality, anemia, renal failure, hepatic failure, sepsis/infection, UTI, viral illness, arrhythmia, thyroid disease, dehydration.    Clinical Tests:   Lab Tests: Ordered and Reviewed  Medical Tests: Ordered and Reviewed  ED Management:  EKG without evidence of malignant arrhythmia.  orthostatic positive. Treated with Toradol, Zofran, 2 L of IV fluids.  He had complete resolution of symptoms.  Lab evaluation with no evidence of MONA, rhabdomyolysis, electrolyte disturbance, or metabolic acidosis.  Discharged with continued p.o. hydration.                      Clinical Impression:     1. Dehydration    2. Dizziness               Scribe attestation: I, Tiburcio Carroll, personally performed the services described in this documentation. All medical record entries made by the scribe were at my direction and in my presence.  I have reviewed the chart and agree that the record reflects my personal performance and is accurate and complete.                      Tiburcio Carroll PA-C  05/16/19 [id number]

## 2019-05-16 NOTE — ED TRIAGE NOTES
Patient reports he was at work and became dizzy, then began to vomit. Reports 5 episodes of vomiting in the past 4 hours. Now reports headache, dizziness upon standing and fatigue. Denies taking anything for the symptoms.

## 2019-10-16 ENCOUNTER — HOSPITAL ENCOUNTER (EMERGENCY)
Facility: HOSPITAL | Age: 34
Discharge: HOME | End: 2019-10-16
Attending: EMERGENCY MEDICINE

## 2019-10-16 VITALS
TEMPERATURE: 99 F | DIASTOLIC BLOOD PRESSURE: 86 MMHG | SYSTOLIC BLOOD PRESSURE: 130 MMHG | HEIGHT: 73 IN | HEART RATE: 83 BPM | WEIGHT: 215 LBS | RESPIRATION RATE: 18 BRPM | OXYGEN SATURATION: 98 % | BODY MASS INDEX: 28.49 KG/M2

## 2019-10-16 DIAGNOSIS — K04.01 PULPITIS: ICD-10-CM

## 2019-10-16 DIAGNOSIS — K08.89 PAIN, DENTAL: Primary | ICD-10-CM

## 2019-10-16 PROCEDURE — 99284 EMERGENCY DEPT VISIT MOD MDM: CPT

## 2019-10-16 PROCEDURE — 25000003 PHARM REV CODE 250: Performed by: NURSE PRACTITIONER

## 2019-10-16 RX ORDER — IBUPROFEN 600 MG/1
600 TABLET ORAL EVERY 6 HOURS PRN
Qty: 20 TABLET | Refills: 0 | Status: SHIPPED | OUTPATIENT
Start: 2019-10-16 | End: 2023-08-11 | Stop reason: CLARIF

## 2019-10-16 RX ORDER — IBUPROFEN 600 MG/1
600 TABLET ORAL
Status: COMPLETED | OUTPATIENT
Start: 2019-10-16 | End: 2019-10-16

## 2019-10-16 RX ORDER — PENICILLIN V POTASSIUM 250 MG/1
500 TABLET, FILM COATED ORAL
Status: COMPLETED | OUTPATIENT
Start: 2019-10-16 | End: 2019-10-16

## 2019-10-16 RX ORDER — HYDROCODONE BITARTRATE AND ACETAMINOPHEN 5; 325 MG/1; MG/1
1 TABLET ORAL
Status: COMPLETED | OUTPATIENT
Start: 2019-10-16 | End: 2019-10-16

## 2019-10-16 RX ORDER — PENICILLIN V POTASSIUM 500 MG/1
500 TABLET, FILM COATED ORAL 4 TIMES DAILY
Qty: 40 TABLET | Refills: 0 | Status: SHIPPED | OUTPATIENT
Start: 2019-10-16 | End: 2019-10-26

## 2019-10-16 RX ORDER — HYDROCODONE BITARTRATE AND ACETAMINOPHEN 5; 325 MG/1; MG/1
1 TABLET ORAL EVERY 4 HOURS PRN
Qty: 12 TABLET | Refills: 0 | Status: SHIPPED | OUTPATIENT
Start: 2019-10-16 | End: 2019-10-19

## 2019-10-16 RX ADMIN — PENICILLIN V POTASSIUM 500 MG: 250 TABLET, FILM COATED ORAL at 11:10

## 2019-10-16 RX ADMIN — IBUPROFEN 600 MG: 600 TABLET, FILM COATED ORAL at 11:10

## 2019-10-16 RX ADMIN — HYDROCODONE BITARTRATE AND ACETAMINOPHEN 1 TABLET: 5; 325 TABLET ORAL at 11:10

## 2019-10-16 NOTE — DISCHARGE INSTRUCTIONS
Please take PENICILLIN for a possible infection for 10 days.    Take IBUPROFEN for pain/inflammation.  If you still have pain, take NORCO for breakthrough pain.  Please do not take Norco while working, drinking alcohol, driving/operating heavy machinery, swimming. It may cause drowsiness, impair judgment, and reduce physical capabilities.    Please follow up with a dentist within 1 week. Call for an appointment as soon as possible.  If you do not have a dentist, refer to the dental resources page for nearby clinics.    Return to the ER for any new or worsening symptoms or concerns.

## 2019-10-16 NOTE — ED PROVIDER NOTES
Encounter Date: 10/16/2019    SCRIBE #1 NOTE: I, Florencia Goff, am scribing for, and in the presence of,  Darlyn Kendrick NP. I have scribed the following portions of the note - Other sections scribed: HPI and ROS.       History     Chief Complaint   Patient presents with    Dental Pain     Pt with right sided dental pain since April. Afebrile      CC: Dental Pain    HPI: This 34 y.o male presents to the ED c/o right lower dental pain. Pt reports that he cracked his wisdom tooth in April 2019, but he notes that the crack has since worsened. The symptoms are acute, constant and severe (10/10). Pt reports use of Tramadol for treatment, with some improvement, but he states that he was unable to sleep last night due to the pain. Pt denies fever, swelling, headache, trouble swallowing and choking. No other associated symptoms. Pt notes that he has an appointment scheduled at [facility] in November.    The history is provided by the patient. No  was used.     Review of patient's allergies indicates:  No Known Allergies  Past Medical History:   Diagnosis Date    ADHD      No past surgical history on file.  Family History   Problem Relation Age of Onset    COPD Neg Hx     Drug abuse Neg Hx     Hyperlipidemia Neg Hx     Mental retardation Neg Hx      Social History     Tobacco Use    Smoking status: Current Every Day Smoker     Packs/day: 0.25     Types: Cigarettes    Smokeless tobacco: Never Used    Tobacco comment: two cigaretts a day   Substance Use Topics    Alcohol use: Yes     Frequency: Never     Comment: rarely    Drug use: Yes     Types: Marijuana     Comment: rarely     Review of Systems   Constitutional: Negative for fever.   HENT: Positive for dental problem (right lower dental pain--cracked wisdom tooth). Negative for facial swelling, sore throat and trouble swallowing.    Respiratory: Negative for choking and shortness of breath.    Cardiovascular: Negative for chest  pain.   Gastrointestinal: Negative for nausea.   Genitourinary: Negative for dysuria.   Musculoskeletal: Negative for back pain.   Skin: Negative for rash.   Neurological: Negative for headaches.       Physical Exam     Initial Vitals [10/16/19 1043]   BP Pulse Resp Temp SpO2   130/86 83 18 98.5 °F (36.9 °C) 98 %      MAP       --         Physical Exam    Nursing note and vitals reviewed.  Constitutional: Vital signs are normal. He appears well-developed and well-nourished. He is not diaphoretic. He is active and cooperative.  Non-toxic appearance. No distress.   HENT:   Mouth/Throat: Uvula is midline. No trismus in the jaw. No uvula swelling.       #32 cracked with tenderness to percussion. No sublingual or buccal swelling. No fluctuant abscess.   Eyes: Pupils are equal, round, and reactive to light.   Neck: Normal range of motion.   Pulmonary/Chest: No respiratory distress.   Neurological: He is alert and oriented to person, place, and time. No sensory deficit.   Skin: Skin is warm and dry.   Psychiatric: He has a normal mood and affect.         ED Course   Procedures  Labs Reviewed - No data to display       Imaging Results    None          Medical Decision Making:   Differential Diagnosis:   Pulpitis, dental infection  ED Management:  This is an urgent evaluation of a 35 y/o male that presents to the ED with dental pain.  He appears to have pulpitis, possibly a periapical abscess/dental infection. Pt is afebrile, non-toxic in appearance; no signs of Teo's angina, airway compromise, facial cellulitis/swelling, sepsis, dehydration, or a fluctuant abscess to drain.  Will discharge with Pen VK and pain control.  Given return precautions and instructed to follow up with a dentist within a week.                        Clinical Impression:       ICD-10-CM ICD-9-CM   1. Pain, dental K08.89 525.9   2. Pulpitis K04.01 522.0         Disposition:   Disposition: Discharged  Condition: Stable    Scribe attestation: I,  Darlyn Kendrick, personally performed the services described in this documentation. All medical record entries made by the scribe were at my direction and in my presence. I have reviewed the chart and agree that the record reflects my personal performance and is accurate and complete.                    Darlyn Kendrick NP  10/16/19 [id number]

## 2019-10-16 NOTE — ED TRIAGE NOTES
Hole in right wisdom tooth since April. Was seen at dentist but was unable to afford extraction. Pain to right jaw 10 out of 10.

## 2019-12-14 ENCOUNTER — HOSPITAL ENCOUNTER (EMERGENCY)
Facility: HOSPITAL | Age: 34
Discharge: HOME | End: 2019-12-14
Attending: EMERGENCY MEDICINE
Payer: COMMERCIAL

## 2019-12-14 VITALS
HEART RATE: 72 BPM | WEIGHT: 225 LBS | RESPIRATION RATE: 18 BRPM | HEIGHT: 73 IN | BODY MASS INDEX: 29.82 KG/M2 | DIASTOLIC BLOOD PRESSURE: 76 MMHG | OXYGEN SATURATION: 99 % | SYSTOLIC BLOOD PRESSURE: 112 MMHG | TEMPERATURE: 98 F
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
## 2019-12-14 DIAGNOSIS — R52 PAIN: ICD-10-CM

## 2019-12-14 DIAGNOSIS — S80.01XA CONTUSION OF RIGHT KNEE, INITIAL ENCOUNTER: ICD-10-CM

## 2019-12-14 DIAGNOSIS — S39.012A STRAIN OF LUMBAR REGION, INITIAL ENCOUNTER: Primary | ICD-10-CM

## 2019-12-14 PROCEDURE — 99283 EMERGENCY DEPT VISIT LOW MDM: CPT | Mod: 25

## 2019-12-14 PROCEDURE — 25000003 PHARM REV CODE 250: Performed by: PHYSICIAN ASSISTANT

## 2019-12-14 RX ORDER — IBUPROFEN 600 MG/1
600 TABLET ORAL EVERY 6 HOURS PRN
Qty: 20 TABLET | Refills: 0 | Status: SHIPPED | OUTPATIENT
Start: 2019-12-14 | End: 2023-08-11 | Stop reason: CLARIF

## 2019-12-14 RX ORDER — IBUPROFEN 600 MG/1
600 TABLET ORAL
Status: COMPLETED | OUTPATIENT
Start: 2019-12-14 | End: 2019-12-14

## 2019-12-14 RX ADMIN — IBUPROFEN 600 MG: 600 TABLET, FILM COATED ORAL at 02:12
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
## 2020-05-14 ENCOUNTER — HOSPITAL ENCOUNTER (EMERGENCY)
Facility: HOSPITAL | Age: 35
Discharge: HOME | End: 2020-05-14
Attending: EMERGENCY MEDICINE

## 2020-05-14 VITALS
WEIGHT: 215 LBS | OXYGEN SATURATION: 95 % | DIASTOLIC BLOOD PRESSURE: 83 MMHG | HEART RATE: 70 BPM | RESPIRATION RATE: 19 BRPM | HEIGHT: 73 IN | BODY MASS INDEX: 28.49 KG/M2 | SYSTOLIC BLOOD PRESSURE: 129 MMHG | TEMPERATURE: 98 F

## 2020-05-14 DIAGNOSIS — M54.12 CERVICAL RADICULOPATHY: ICD-10-CM

## 2020-05-14 DIAGNOSIS — M79.601 PAIN IN BOTH UPPER EXTREMITIES: Primary | ICD-10-CM

## 2020-05-14 DIAGNOSIS — M79.602 PAIN IN BOTH UPPER EXTREMITIES: Primary | ICD-10-CM

## 2020-05-14 DIAGNOSIS — R20.2 PARESTHESIAS: ICD-10-CM

## 2020-05-14 PROCEDURE — 63600175 PHARM REV CODE 636 W HCPCS: Performed by: PHYSICIAN ASSISTANT

## 2020-05-14 PROCEDURE — 96372 THER/PROPH/DIAG INJ SC/IM: CPT

## 2020-05-14 PROCEDURE — 25000003 PHARM REV CODE 250: Performed by: PHYSICIAN ASSISTANT

## 2020-05-14 PROCEDURE — 99284 EMERGENCY DEPT VISIT MOD MDM: CPT | Mod: 25

## 2020-05-14 RX ORDER — METHOCARBAMOL 500 MG/1
1000 TABLET, FILM COATED ORAL 3 TIMES DAILY
Qty: 30 TABLET | Refills: 0 | Status: SHIPPED | OUTPATIENT
Start: 2020-05-14 | End: 2020-05-19

## 2020-05-14 RX ORDER — CYCLOBENZAPRINE HCL 10 MG
10 TABLET ORAL
Status: COMPLETED | OUTPATIENT
Start: 2020-05-14 | End: 2020-05-14

## 2020-05-14 RX ORDER — NAPROXEN 500 MG/1
500 TABLET ORAL 2 TIMES DAILY WITH MEALS
Qty: 14 TABLET | Refills: 0 | Status: SHIPPED | OUTPATIENT
Start: 2020-05-14 | End: 2020-05-21

## 2020-05-14 RX ORDER — KETOROLAC TROMETHAMINE 30 MG/ML
30 INJECTION, SOLUTION INTRAMUSCULAR; INTRAVENOUS
Status: COMPLETED | OUTPATIENT
Start: 2020-05-14 | End: 2020-05-14

## 2020-05-14 RX ORDER — PREDNISONE 20 MG/1
40 TABLET ORAL DAILY
Qty: 10 TABLET | Refills: 0 | Status: SHIPPED | OUTPATIENT
Start: 2020-05-14 | End: 2020-05-19

## 2020-05-14 RX ORDER — PREDNISONE 20 MG/1
60 TABLET ORAL
Status: COMPLETED | OUTPATIENT
Start: 2020-05-14 | End: 2020-05-14

## 2020-05-14 RX ADMIN — PREDNISONE 60 MG: 20 TABLET ORAL at 06:05

## 2020-05-14 RX ADMIN — CYCLOBENZAPRINE HYDROCHLORIDE 10 MG: 10 TABLET, FILM COATED ORAL at 06:05

## 2020-05-14 RX ADMIN — KETOROLAC TROMETHAMINE 30 MG: 30 INJECTION, SOLUTION INTRAMUSCULAR at 06:05

## 2020-05-14 NOTE — ED TRIAGE NOTES
Pt reports numbness of his fingers that started yesterday. Pt also reports pain and describes the pain as a burning sensation. Denies any allergies, SOB, or LOC.

## 2020-05-14 NOTE — DISCHARGE INSTRUCTIONS
Please take new medications as directed and follow discharge instructions provided.  Please make sure to follow-up with your PCP to discuss today's emergency department visit and for further evaluation and management.  Please return to the emergency department if your symptoms worsen or you develop any additional concerning symptoms.

## 2020-05-14 NOTE — ED PROVIDER NOTES
"Encounter Date: 5/14/2020       History     Chief Complaint   Patient presents with    Numbness     Symptoms started yesterday around 2am and has been progressively worsening. Pt reports he awakened to arm numbness, tingling and burning sensation from the shoulders to the fingers.      Mr. Kaufman is a 34-year-old male patient with no pertinent past medical history that presents to the ED for urgent evaluation of bilateral upper extremity pain.  Pain originates in bilateral shoulders at AC joint and radiates down bilateral arms into his hands and fingers.  Patient describes the pain as pins and needles with decreased sensation.  Patient currently rates the pain 7/10.  States that the pain kept him up last night and could not sleep.  Symptoms started approximately 2:00 a.m. yesterday morning.  States that the day before he had been working with a "cement " and believes that he must have strained something.  States that he had similar symptoms back in 2008 but did not seek treatment and resolved without intervention.  Denies any fevers, chills, body aches, headaches, dizziness, cough, chest pain, shortness of breath, abdominal pain, nausea, vomiting, diarrhea, urinary symptoms.        Review of patient's allergies indicates:  No Known Allergies  Past Medical History:   Diagnosis Date    ADHD      History reviewed. No pertinent surgical history.  Family History   Problem Relation Age of Onset    COPD Neg Hx     Drug abuse Neg Hx     Hyperlipidemia Neg Hx     Mental retardation Neg Hx      Social History     Tobacco Use    Smoking status: Current Every Day Smoker     Packs/day: 0.25     Types: Cigarettes    Smokeless tobacco: Never Used    Tobacco comment: two cigaretts a day   Substance Use Topics    Alcohol use: Yes     Frequency: Never     Comment: rarely    Drug use: Yes     Types: Marijuana     Comment: rarely     Review of Systems   Constitutional: Negative for chills and fever.   HENT: Negative " for congestion, rhinorrhea and sore throat.    Eyes: Negative for pain and visual disturbance.   Respiratory: Negative for cough and shortness of breath.    Cardiovascular: Negative for chest pain.   Gastrointestinal: Negative for abdominal pain, diarrhea, nausea and vomiting.   Genitourinary: Negative for dysuria, flank pain and frequency.   Musculoskeletal: Negative for back pain and neck pain.   Skin: Negative for rash.   Allergic/Immunologic: Negative for immunocompromised state.   Neurological: Negative for syncope, numbness and headaches.   Psychiatric/Behavioral: Negative for confusion.       Physical Exam     Initial Vitals [05/14/20 0620]   BP Pulse Resp Temp SpO2   126/81 83 20 97.7 °F (36.5 °C) 99 %      MAP       --         Physical Exam    Constitutional: He appears well-developed and well-nourished. He is cooperative.  Non-toxic appearance. No distress.   HENT:   Head: Normocephalic and atraumatic.   Right Ear: External ear normal.   Left Ear: External ear normal.   Nose: Nose normal.   Mouth/Throat: Uvula is midline, oropharynx is clear and moist and mucous membranes are normal.   Eyes: Conjunctivae and EOM are normal. Pupils are equal, round, and reactive to light.   Neck: Normal range of motion. Neck supple.   Cardiovascular: Normal rate and normal heart sounds.   1+ bilateral radial pulses.  Brisk cap refill bilaterally   Pulmonary/Chest: Effort normal and breath sounds normal. No respiratory distress.   Abdominal: Soft. There is no tenderness.   Musculoskeletal: Normal range of motion.   Neurological: He is alert. He has normal strength. No cranial nerve deficit or sensory deficit. Coordination and gait normal.   No pronator drift on exam.  Finger-to-nose normal.  Heel-to-shin normal.  5/5 strength in bilateral upper extremities and bilateral lower extremities.  Steady gait.   Skin: Skin is warm, dry and intact.         ED Course   Procedures  Labs Reviewed - No data to display       Imaging  Results    None          Medical Decision Making:   ED Management:  Hemodynamically stable.  Nontoxic and in no acute distress.  Patient is overall well-appearing, pleasant, conversational.  No focal neuro deficits on exam.  History and physical exam consistent with cervical radiculopathy.  Will treat pain with prednisone, Toradol, Flexeril.  Will discharge home with short course prednisone, NSAIDs, muscle relaxers.  Follow-up with PCP.  Patient verbalizes understanding and is agreeable with plan. Return instructions given                                 Clinical Impression:       ICD-10-CM ICD-9-CM   1. Pain in both upper extremities M79.601 729.5    M79.602    2. Cervical radiculopathy M54.12 723.4   3. Paresthesias R20.2 782.0         Disposition:   Disposition: Discharged  Condition: Stable     ED Disposition Condition    Discharge Stable        ED Prescriptions     Medication Sig Dispense Start Date End Date Auth. Provider    predniSONE (DELTASONE) 20 MG tablet Take 2 tablets (40 mg total) by mouth once daily. for 5 days 10 tablet 5/14/2020 5/19/2020 Jeremías Perez PA-C    naproxen (NAPROSYN) 500 MG tablet Take 1 tablet (500 mg total) by mouth 2 (two) times daily with meals. for 7 days 14 tablet 5/14/2020 5/21/2020 Jeremías Perez PA-C    methocarbamoL (ROBAXIN) 500 MG Tab Take 2 tablets (1,000 mg total) by mouth 3 (three) times daily. for 5 days 30 tablet 5/14/2020 5/19/2020 Jeremías Perez PA-C        Follow-up Information     Follow up With Specialties Details Why Contact Info    Terrance Wu MD Family Medicine Schedule an appointment as soon as possible for a visit   [address] [facility] [address]  [phone]      [facility] Emergency Medicine Schedule an appointment as soon as possible for a visit   [address]  [facility] [address] [phone]                                     Jeremías Perez PA-C  05/14/20 0790

## 2022-01-02 ENCOUNTER — HOSPITAL ENCOUNTER (EMERGENCY)
Facility: HOSPITAL | Age: 37
Discharge: HOME | End: 2022-01-02
Attending: EMERGENCY MEDICINE
Payer: OTHER GOVERNMENT

## 2022-01-02 VITALS
RESPIRATION RATE: 18 BRPM | DIASTOLIC BLOOD PRESSURE: 96 MMHG | WEIGHT: 215 LBS | TEMPERATURE: 98 F | SYSTOLIC BLOOD PRESSURE: 139 MMHG | HEART RATE: 94 BPM | BODY MASS INDEX: 28.49 KG/M2 | HEIGHT: 73 IN | OXYGEN SATURATION: 99 %

## 2022-01-02 DIAGNOSIS — U07.1 COVID-19: Primary | ICD-10-CM

## 2022-01-02 DIAGNOSIS — R07.89 CHEST WALL PAIN: ICD-10-CM

## 2022-01-02 LAB
CTP QC/QA: YES
SARS-COV-2 RDRP RESP QL NAA+PROBE: POSITIVE

## 2022-01-02 PROCEDURE — 93010 ELECTROCARDIOGRAM REPORT: CPT | Mod: ,,, | Performed by: INTERNAL MEDICINE

## 2022-01-02 PROCEDURE — 99283 EMERGENCY DEPT VISIT LOW MDM: CPT | Mod: 25

## 2022-01-02 PROCEDURE — 93005 ELECTROCARDIOGRAM TRACING: CPT

## 2022-01-02 PROCEDURE — 93010 EKG 12-LEAD: ICD-10-PCS | Mod: ,,, | Performed by: INTERNAL MEDICINE

## 2022-01-02 PROCEDURE — U0002 COVID-19 LAB TEST NON-CDC: HCPCS | Performed by: EMERGENCY MEDICINE

## 2022-01-02 RX ORDER — ALBUTEROL SULFATE 90 UG/1
1-2 AEROSOL, METERED RESPIRATORY (INHALATION) EVERY 6 HOURS PRN
Qty: 18 G | Refills: 0 | Status: SHIPPED | OUTPATIENT
Start: 2022-01-02 | End: 2023-08-11 | Stop reason: CLARIF

## 2022-01-02 RX ORDER — IBUPROFEN 600 MG/1
600 TABLET ORAL EVERY 6 HOURS PRN
Qty: 20 TABLET | Refills: 0 | Status: SHIPPED | OUTPATIENT
Start: 2022-01-02 | End: 2023-08-11 | Stop reason: CLARIF

## 2022-01-02 RX ORDER — CETIRIZINE HYDROCHLORIDE 10 MG/1
10 TABLET ORAL DAILY
Qty: 5 TABLET | Refills: 0 | Status: SHIPPED | OUTPATIENT
Start: 2022-01-02 | End: 2023-08-11 | Stop reason: CLARIF

## 2022-01-02 NOTE — ED PROVIDER NOTES
"Encounter Date: 1/2/2022    SCRIBE #1 NOTE: I, Lupe Tyler, am scribing for, and in the presence of,  Anthony Harris MD. I have scribed the following portions of the note - Other sections scribed: HPI, ROS, PE.       History     Chief Complaint   Patient presents with    Cough    Chest Pain     Patient reports cough chest congestion and  squeezing CP  that started today. Some SOB with the CP.      HPI:  36 year old male with no pertinent PMHx presents to the ED complaining of cough, rhinorrhea, and congestion that began at 1 PM today. The patient also reports "squeezing" chest pain that occurs when he coughs. He denies fever, shortness of breath, nausea, vomiting, or other symptoms at this time. No medications or other treatments attempted prior to arrival. No known sick contacts reported. Patient is not vaccinated for Covid-19.    The history is provided by the patient. No  was used.     Review of patient's allergies indicates:  No Known Allergies  Past Medical History:   Diagnosis Date    ADHD      History reviewed. No pertinent surgical history.  Family History   Problem Relation Age of Onset    COPD Neg Hx     Drug abuse Neg Hx     Hyperlipidemia Neg Hx     Mental retardation Neg Hx      Social History     Tobacco Use    Smoking status: Current Every Day Smoker     Packs/day: 0.25     Types: Cigarettes    Smokeless tobacco: Never Used    Tobacco comment: two cigaretts a day   Substance Use Topics    Alcohol use: Yes     Comment: rarely    Drug use: Yes     Types: Marijuana     Comment: rarely     Review of Systems   Constitutional: Negative for chills and fever.   HENT: Positive for congestion and rhinorrhea. Negative for sore throat.    Eyes: Negative for pain and visual disturbance.   Respiratory: Positive for cough. Negative for chest tightness and shortness of breath.    Cardiovascular: Positive for chest pain.   Gastrointestinal: Negative for nausea.   Endocrine: Negative for " polydipsia and polyuria.   Genitourinary: Negative for dysuria and flank pain.   Musculoskeletal: Negative for back pain, neck pain and neck stiffness.   Skin: Negative for rash.   Allergic/Immunologic: Negative for immunocompromised state.   Neurological: Negative for dizziness and weakness.   Hematological: Does not bruise/bleed easily.   Psychiatric/Behavioral: Negative for agitation and behavioral problems.   All other systems reviewed and are negative.      Physical Exam     Initial Vitals [01/02/22 1718]   BP Pulse Resp Temp SpO2   (!) 139/96 94 20 98 °F (36.7 °C) 99 %      MAP       --         Physical Exam    Nursing note and vitals reviewed.  Constitutional: He appears well-developed and well-nourished.   HENT:   Head: Normocephalic.   Right Ear: External ear normal.   Left Ear: External ear normal.   Post nasal drip   Eyes: EOM are normal. Pupils are equal, round, and reactive to light.   Neck:   Normal range of motion.  Cardiovascular: Normal rate and regular rhythm.   Pulmonary/Chest: Breath sounds normal. No respiratory distress. He has no wheezes. He exhibits tenderness.   Abdominal: Abdomen is soft. Bowel sounds are normal. He exhibits no distension. There is no abdominal tenderness.   Musculoskeletal:         General: No tenderness or edema. Normal range of motion.      Cervical back: Normal range of motion.     Neurological: He is alert and oriented to person, place, and time. GCS score is 15. GCS eye subscore is 4. GCS verbal subscore is 5. GCS motor subscore is 6.   Skin: Skin is warm and dry. Capillary refill takes less than 2 seconds.   Psychiatric: He has a normal mood and affect. Thought content normal.         ED Course   Procedures  Labs Reviewed   SARS-COV-2 RDRP GENE - Abnormal; Notable for the following components:       Result Value    POC Rapid COVID Positive (*)     All other components within normal limits    Narrative:     This test utilizes isothermal nucleic acid amplification    technology to detect the SARS-CoV-2 RdRp nucleic acid segment.   The analytical sensitivity (limit of detection) is 125 genome   equivalents/mL.   A POSITIVE result implies infection with the SARS-CoV-2 virus;   the patient is presumed to be contagious.     A NEGATIVE result means that SARS-CoV-2 nucleic acids are not   present above the limit of detection. A NEGATIVE result should be   treated as presumptive. It does not rule out the possibility of   COVID-19 and should not be the sole basis for treatment decisions.   If COVID-19 is strongly suspected based on clinical and exposure   history, re-testing using an alternate molecular assay should be   considered.   This test is only for use under the Food and Drug   Administration s Emergency Use Authorization (EUA).   Commercial kits are provided by [employer].   Performance characteristics of the EUA have been independently   verified by [facility] Department of   Pathology and Laboratory Medicine.   _________________________________________________________________   The authorized Fact Sheet for Healthcare Providers and the authorized Fact   Sheet for Patients of the ID NOW COVID-19 are available on the FDA   website:     https://www.fda.gov/media/708142/download  https://www.fda.gov/media/256963/download         EKG Readings: (Independently Interpreted)   Initial Reading: No STEMI. Rhythm: Normal Sinus Rhythm. Heart Rate: 94. Ectopy: No Ectopy. Conduction: Normal. ST Segments: Normal ST Segments. T Waves: Normal. Clinical Impression: Normal Sinus Rhythm       Imaging Results    None          Medications - No data to display  Medical Decision Making:   Initial Assessment:   36 yr old patient presenting with constellation of symptoms most c/w COVID 19 with a positive COVID 19 test.     Also considered but less likely:  PTA/RPA: no hot potato voice, no uvular deviation,  Esophageal rupture: No history of dysphagia  Unlikely deep space  infection/Teos  Low suspicion for CNS infection or bacterial sinusitis given exam and history.  ekg reassuring    To be discharged home on meds below. No respiratory distress, otherwise relatively well appearing and nontoxic. Ambulatory O2 sats of 99% and patient in no acute distress. Return precautions given, patient understands and agrees with plan. All questions answered.  Instructed to follow up with PCP. There is currently no accepted treatment except conservative measures and respiratory support if appropriate.  This patient will be discharged home with strict return precautions if worsening respiratory status.  Patient was made aware other resource limitations and the fact that they could have worsening symptoms.  Patient was informed to quarantine themselves for per guidelines. Covid risk score of 1. I did not order the infusion due to the Covid risk score.  COVID home monitoring ordered for patient          Clinical Tests:   Lab Tests: Ordered and Reviewed  Medical Tests: Ordered and Reviewed          Scribe Attestation:   Scribe #1: I performed the above scribed service and the documentation accurately describes the services I performed. I attest to the accuracy of the note.                 Clinical Impression:   Final diagnoses:  [U07.1] COVID-19 (Primary)  [R07.89] Chest wall pain          ED Disposition Condition    Discharge Stable        ED Prescriptions     Medication Sig Dispense Start Date End Date Auth. Provider    ibuprofen (ADVIL,MOTRIN) 600 MG tablet Take 1 tablet (600 mg total) by mouth every 6 (six) hours as needed for Pain. 20 tablet 1/2/2022  Anthony Harris MD    albuterol (PROVENTIL/VENTOLIN HFA) 90 mcg/actuation inhaler Inhale 1-2 puffs into the lungs every 6 (six) hours as needed. Rescue 18 g 1/2/2022 1/2/2023 Anthony Harris MD    cetirizine (ZYRTEC) 10 MG tablet Take 1 tablet (10 mg total) by mouth once daily. for 5 days 5 tablet 1/2/2022 1/7/2022 Anthony Harris MD         Follow-up Information     Follow up With Specialties Details Why Contact Info    Terrance Wu MD Family Medicine Schedule an appointment as soon as possible for a visit in 2 days  [address] [facility] [address]  [phone]           I, anthony harris, personally performed the services described in this documentation. All medical record entries made by the scribe were at my direction and in my presence. I have reviewed the chart and agree that the record reflects my personal performance and is accurate and complete.       Anthony Harris MD  01/02/22 8853

## 2022-01-02 NOTE — DISCHARGE INSTRUCTIONS
f you have a COVID Test PENDING:  Please access [facility] to review the results of your test. Until the results of your COVID test return, you should isolate yourself so as not to potentially spread illness to others.   If your COVID test returns positive, you should isolate yourself so as not to spread illness to others. After five full days, if you are feeling better and you have not had fever for 24 hours, you can return to your typical daily activities, but you must wear a mask around others for an additional 5 days.   If your COVID test returns negative and you are either unvaccinated or more than six months out from your two-dose vaccine and are not yet boosted, you should still quarantine for 5 full days followed by strict mask use for an additional 5 full days.   If your COVID test returns negative and you have received your 2-dose initial vaccine as well as a booster, you should continue strict mask use for 10 full days after the exposure.  For all those exposed, best practice includes a test at day 5 after the exposure. This can be a home test or a test through one of the many testing centers throughout our community.   Masking is always advised to limit the spread of COVID. Cdc.gov is an excellent site to obtain the latest up to date recommendations regarding COVID and COVID testing.     After your evaluation today, we ruled out any emergent condition. However, if you develop shortness of breath, chest pain, or ANY OTHER CONCERNS please return to the emergency department for further care.      CDC Testing and Quarantine Guidelines for patients with exposure to a known-positive COVID-19 person:  A close exposure is defined as anyone who has had an exposure (masked or unmasked) to a known COVID -19 positive person within 6 feet of someone for a cumulative total of 15 minutes or more over a 24-hour period.   Vaccinated and/or if you recently had a positive covid test within 90 days do NOT need to  quarantine after contact with someone who had COVID-19 unless you develop symptoms.   Fully vaccinated people who have not had a positive test within 90 days, should get tested 3-5 days after their exposure, even if they don't have symptoms and wear a mask indoors in public for 14 days following exposure or until their test result is negative.      Unvaccinated and/or NOT had a positive test within 90 days and meet close exposure  You are required by CDC guidelines to quarantine for at least 5 days from time of exposure followed by 5 days of strict masking. It is recommended, but not required to test after 5 days, unless you develop symptoms, in which case you should test at that time.  If you get tested after 5 days and your test is positive, your 5 day period of isolation starts the day of the positive test.    If your exposure does not meet the above definition, you can return to your normal daily activities to include social distancing, wearing a mask and frequent handwashing.      Here is a link to guidance from the CDC:  https://www.cdc.gov/media/releases/2021/s1227-isolation-quarantine-guidance.html      [facility] Testing Sites:  https://ldh.la.gov/page/3934      [facility] website with testing locations and guidance:  [email or web address]              Thank you for coming to our Emergency Department today. It is important to remember that some problems are difficult to diagnose and may not be found during your first visit. Be sure to follow up with your primary care doctor and review any labs/imaging that was performed with them. If you do not have a primary care doctor, you may contact the one listed on your discharge paperwork or you may also call the [facility] Appointment Desk at [phone] to schedule an appointment with one.     All medications may potentially have side effects and it is impossible to predict which medications may give you side effects. If you feel  that you are having a negative effect of any medication you should immediately stop taking them and seek medical attention.    Return to the ER with any questions/concerns, new/concerning symptoms, worsening or failure to improve. Do not drive or make any important decisions for 24 hours if you have received any pain medications, sedatives or mood altering drugs during your ER visit.

## 2022-01-02 NOTE — FIRST PROVIDER EVALUATION
"Medical screening exam completed.  I have conducted a focused provider triage encounter, findings are as follows:    Brief history of present illness:  Pt reports today for one day of cough, CP at rest. Reports CP as "squeezing" no radiation down extremities. Some SOB associated. No recent SC.    There were no vitals filed for this visit.    Pertinent physical exam:  NAD, pulse regular    Brief workup plan:  covid testing, ekg    Preliminary workup initiated; this workup will be continued and followed by the physician or advanced practice provider that is assigned to the patient when roomed.  "

## 2022-09-14 ENCOUNTER — HOSPITAL ENCOUNTER (EMERGENCY)
Facility: HOSPITAL | Age: 37
Discharge: HOME | End: 2022-09-14
Attending: STUDENT IN AN ORGANIZED HEALTH CARE EDUCATION/TRAINING PROGRAM

## 2022-09-14 VITALS
DIASTOLIC BLOOD PRESSURE: 72 MMHG | WEIGHT: 215 LBS | TEMPERATURE: 99 F | OXYGEN SATURATION: 99 % | HEIGHT: 73 IN | BODY MASS INDEX: 28.49 KG/M2 | RESPIRATION RATE: 16 BRPM | HEART RATE: 82 BPM | SYSTOLIC BLOOD PRESSURE: 124 MMHG

## 2022-09-14 DIAGNOSIS — M79.644 FINGER PAIN, RIGHT: Primary | ICD-10-CM

## 2022-09-14 PROCEDURE — 99283 EMERGENCY DEPT VISIT LOW MDM: CPT

## 2022-09-14 RX ORDER — IBUPROFEN 600 MG/1
600 TABLET ORAL EVERY 6 HOURS PRN
Qty: 20 TABLET | Refills: 0 | Status: SHIPPED | OUTPATIENT
Start: 2022-09-14 | End: 2023-08-11 | Stop reason: CLARIF

## 2022-09-14 NOTE — Clinical Note
"Johan Kaufman (Raheem) was seen and treated in our emergency department on 9/14/2022.  He may return to work on 09/15/2022.       If you have any questions or concerns, please don't hesitate to call.      Alayna Holdsworth, PA-C"

## 2022-09-14 NOTE — DISCHARGE INSTRUCTIONS
Thank you for coming to our Emergency Department today. It is important to remember that some problems are difficult to diagnose and may not be found during your first visit. Be sure to follow up with your primary care doctor and review any labs/imaging that was performed with them. If you do not have a primary care doctor, you may contact the one listed on your discharge paperwork or you may also call the [facility] Appointment Desk at [phone] to schedule an appointment with one.     All medications may potentially have side effects and it is impossible to predict which medications may give you side effects. If you feel that you are having a negative effect of any medication you should immediately stop taking them and seek medical attention.    Return to the ER with any questions/concerns, new/concerning symptoms, worsening or failure to improve. Do not drive or make any important decisions for 24 hours if you have received any pain medications, sedatives or mood altering drugs during your ER visit.

## 2022-09-14 NOTE — ED NOTES
Pt presents w/ c/o right 4th and 5th digit pain since this am.  Denies trauma.  Pt is AAOx3, resp even and unlabored, skin warm and dry.  NAD noted.
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
## 2023-08-11 ENCOUNTER — HOSPITAL ENCOUNTER (OUTPATIENT)
Facility: HOSPITAL | Age: 38
Discharge: HOME | End: 2023-08-12
Attending: EMERGENCY MEDICINE | Admitting: STUDENT IN AN ORGANIZED HEALTH CARE EDUCATION/TRAINING PROGRAM
Payer: MEDICAID

## 2023-08-11 DIAGNOSIS — R07.9 CHEST PAIN: Primary | ICD-10-CM

## 2023-08-11 DIAGNOSIS — R55 SYNCOPE, UNSPECIFIED SYNCOPE TYPE: ICD-10-CM

## 2023-08-11 DIAGNOSIS — R55 SYNCOPE: ICD-10-CM

## 2023-08-11 LAB
ALBUMIN SERPL BCP-MCNC: 3.9 G/DL (ref 3.5–5.2)
ALP SERPL-CCNC: 71 U/L (ref 55–135)
ALT SERPL W/O P-5'-P-CCNC: 10 U/L (ref 10–44)
AMPHET+METHAMPHET UR QL: NEGATIVE
ANION GAP SERPL CALC-SCNC: 7 MMOL/L (ref 8–16)
AST SERPL-CCNC: 14 U/L (ref 10–40)
BARBITURATES UR QL SCN>200 NG/ML: NEGATIVE
BASOPHILS # BLD AUTO: 0.05 K/UL (ref 0–0.2)
BASOPHILS NFR BLD: 0.6 % (ref 0–1.9)
BENZODIAZ UR QL SCN>200 NG/ML: NEGATIVE
BILIRUB SERPL-MCNC: 0.5 MG/DL (ref 0.1–1)
BNP SERPL-MCNC: <10 PG/ML (ref 0–99)
BUN SERPL-MCNC: 18 MG/DL (ref 6–20)
BZE UR QL SCN: NEGATIVE
CALCIUM SERPL-MCNC: 9.5 MG/DL (ref 8.7–10.5)
CANNABINOIDS UR QL SCN: ABNORMAL
CHLORIDE SERPL-SCNC: 107 MMOL/L (ref 95–110)
CO2 SERPL-SCNC: 28 MMOL/L (ref 23–29)
CREAT SERPL-MCNC: 1 MG/DL (ref 0.5–1.4)
CREAT UR-MCNC: 290.9 MG/DL (ref 23–375)
DIFFERENTIAL METHOD: ABNORMAL
EOSINOPHIL # BLD AUTO: 0.3 K/UL (ref 0–0.5)
EOSINOPHIL NFR BLD: 4.3 % (ref 0–8)
ERYTHROCYTE [DISTWIDTH] IN BLOOD BY AUTOMATED COUNT: 13.2 % (ref 11.5–14.5)
EST. GFR  (NO RACE VARIABLE): >60 ML/MIN/1.73 M^2
ETHANOL SERPL-MCNC: <10 MG/DL
GLUCOSE SERPL-MCNC: 81 MG/DL (ref 70–110)
HCT VFR BLD AUTO: 44.1 % (ref 40–54)
HGB BLD-MCNC: 14.5 G/DL (ref 14–18)
IMM GRANULOCYTES # BLD AUTO: 0.01 K/UL (ref 0–0.04)
IMM GRANULOCYTES NFR BLD AUTO: 0.1 % (ref 0–0.5)
LYMPHOCYTES # BLD AUTO: 2.4 K/UL (ref 1–4.8)
LYMPHOCYTES NFR BLD: 30.7 % (ref 18–48)
MCH RBC QN AUTO: 29.1 PG (ref 27–31)
MCHC RBC AUTO-ENTMCNC: 32.9 G/DL (ref 32–36)
MCV RBC AUTO: 89 FL (ref 82–98)
METHADONE UR QL SCN>300 NG/ML: NEGATIVE
MONOCYTES # BLD AUTO: 1.4 K/UL (ref 0.3–1)
MONOCYTES NFR BLD: 18.1 % (ref 4–15)
NEUTROPHILS # BLD AUTO: 3.6 K/UL (ref 1.8–7.7)
NEUTROPHILS NFR BLD: 46.2 % (ref 38–73)
NRBC BLD-RTO: 0 /100 WBC
OPIATES UR QL SCN: NEGATIVE
PCP UR QL SCN>25 NG/ML: NEGATIVE
PLATELET # BLD AUTO: 287 K/UL (ref 150–450)
PMV BLD AUTO: 10.5 FL (ref 9.2–12.9)
POTASSIUM SERPL-SCNC: 3.9 MMOL/L (ref 3.5–5.1)
PROT SERPL-MCNC: 7.5 G/DL (ref 6–8.4)
RBC # BLD AUTO: 4.98 M/UL (ref 4.6–6.2)
SODIUM SERPL-SCNC: 142 MMOL/L (ref 136–145)
TOXICOLOGY INFORMATION: ABNORMAL
TROPONIN I SERPL DL<=0.01 NG/ML-MCNC: <0.006 NG/ML (ref 0–0.03)
TROPONIN I SERPL DL<=0.01 NG/ML-MCNC: <0.006 NG/ML (ref 0–0.03)
WBC # BLD AUTO: 7.88 K/UL (ref 3.9–12.7)

## 2023-08-11 PROCEDURE — 96374 THER/PROPH/DIAG INJ IV PUSH: CPT | Mod: 59

## 2023-08-11 PROCEDURE — 96375 TX/PRO/DX INJ NEW DRUG ADDON: CPT | Mod: 59

## 2023-08-11 PROCEDURE — 93005 ELECTROCARDIOGRAM TRACING: CPT

## 2023-08-11 PROCEDURE — 82077 ASSAY SPEC XCP UR&BREATH IA: CPT | Performed by: EMERGENCY MEDICINE

## 2023-08-11 PROCEDURE — 93010 ELECTROCARDIOGRAM REPORT: CPT | Mod: ,,, | Performed by: INTERNAL MEDICINE

## 2023-08-11 PROCEDURE — 83880 ASSAY OF NATRIURETIC PEPTIDE: CPT | Performed by: EMERGENCY MEDICINE

## 2023-08-11 PROCEDURE — 96361 HYDRATE IV INFUSION ADD-ON: CPT

## 2023-08-11 PROCEDURE — 63600175 PHARM REV CODE 636 W HCPCS: Performed by: HOSPITALIST

## 2023-08-11 PROCEDURE — 63600175 PHARM REV CODE 636 W HCPCS: Performed by: EMERGENCY MEDICINE

## 2023-08-11 PROCEDURE — 85025 COMPLETE CBC W/AUTO DIFF WBC: CPT | Performed by: EMERGENCY MEDICINE

## 2023-08-11 PROCEDURE — G0378 HOSPITAL OBSERVATION PER HR: HCPCS

## 2023-08-11 PROCEDURE — 96372 THER/PROPH/DIAG INJ SC/IM: CPT | Performed by: HOSPITALIST

## 2023-08-11 PROCEDURE — 93010 ELECTROCARDIOGRAM REPORT: CPT | Mod: 59,,, | Performed by: INTERNAL MEDICINE

## 2023-08-11 PROCEDURE — 80307 DRUG TEST PRSMV CHEM ANLYZR: CPT | Performed by: EMERGENCY MEDICINE

## 2023-08-11 PROCEDURE — 84484 ASSAY OF TROPONIN QUANT: CPT | Performed by: EMERGENCY MEDICINE

## 2023-08-11 PROCEDURE — 93010 EKG 12-LEAD: ICD-10-PCS | Mod: 59,,, | Performed by: INTERNAL MEDICINE

## 2023-08-11 PROCEDURE — 25500020 PHARM REV CODE 255: Performed by: EMERGENCY MEDICINE

## 2023-08-11 PROCEDURE — 80053 COMPREHEN METABOLIC PANEL: CPT | Performed by: EMERGENCY MEDICINE

## 2023-08-11 PROCEDURE — 99285 EMERGENCY DEPT VISIT HI MDM: CPT | Mod: 25

## 2023-08-11 PROCEDURE — 84484 ASSAY OF TROPONIN QUANT: CPT | Mod: 91 | Performed by: EMERGENCY MEDICINE

## 2023-08-11 PROCEDURE — 25000003 PHARM REV CODE 250: Performed by: EMERGENCY MEDICINE

## 2023-08-11 RX ORDER — ACETAMINOPHEN 325 MG/1
650 TABLET ORAL EVERY 6 HOURS PRN
Status: DISCONTINUED | OUTPATIENT
Start: 2023-08-11 | End: 2023-08-12 | Stop reason: HOSPADM

## 2023-08-11 RX ORDER — MORPHINE SULFATE 4 MG/ML
4 INJECTION, SOLUTION INTRAMUSCULAR; INTRAVENOUS
Status: COMPLETED | OUTPATIENT
Start: 2023-08-11 | End: 2023-08-11

## 2023-08-11 RX ORDER — IBUPROFEN 600 MG/1
600 TABLET ORAL EVERY 6 HOURS PRN
Qty: 20 TABLET | Refills: 0 | Status: SHIPPED | OUTPATIENT
Start: 2023-08-11 | End: 2023-08-27

## 2023-08-11 RX ORDER — KETOROLAC TROMETHAMINE 30 MG/ML
15 INJECTION, SOLUTION INTRAMUSCULAR; INTRAVENOUS
Status: COMPLETED | OUTPATIENT
Start: 2023-08-11 | End: 2023-08-11

## 2023-08-11 RX ORDER — IBUPROFEN 200 MG
16 TABLET ORAL
Status: DISCONTINUED | OUTPATIENT
Start: 2023-08-11 | End: 2023-08-12 | Stop reason: HOSPADM

## 2023-08-11 RX ORDER — HYDROCODONE BITARTRATE AND ACETAMINOPHEN 5; 325 MG/1; MG/1
1 TABLET ORAL
Status: COMPLETED | OUTPATIENT
Start: 2023-08-11 | End: 2023-08-11

## 2023-08-11 RX ORDER — ONDANSETRON 2 MG/ML
4 INJECTION INTRAMUSCULAR; INTRAVENOUS EVERY 8 HOURS PRN
Status: DISCONTINUED | OUTPATIENT
Start: 2023-08-11 | End: 2023-08-12 | Stop reason: HOSPADM

## 2023-08-11 RX ORDER — SODIUM CHLORIDE 0.9 % (FLUSH) 0.9 %
10 SYRINGE (ML) INJECTION EVERY 8 HOURS PRN
Status: DISCONTINUED | OUTPATIENT
Start: 2023-08-11 | End: 2023-08-12 | Stop reason: HOSPADM

## 2023-08-11 RX ORDER — ENOXAPARIN SODIUM 100 MG/ML
40 INJECTION SUBCUTANEOUS EVERY 24 HOURS
Status: DISCONTINUED | OUTPATIENT
Start: 2023-08-11 | End: 2023-08-12 | Stop reason: HOSPADM

## 2023-08-11 RX ORDER — IBUPROFEN 200 MG
24 TABLET ORAL
Status: DISCONTINUED | OUTPATIENT
Start: 2023-08-11 | End: 2023-08-12 | Stop reason: HOSPADM

## 2023-08-11 RX ORDER — PROCHLORPERAZINE EDISYLATE 5 MG/ML
5 INJECTION INTRAMUSCULAR; INTRAVENOUS EVERY 6 HOURS PRN
Status: DISCONTINUED | OUTPATIENT
Start: 2023-08-11 | End: 2023-08-12 | Stop reason: HOSPADM

## 2023-08-11 RX ORDER — NALOXONE HCL 0.4 MG/ML
0.02 VIAL (ML) INJECTION
Status: DISCONTINUED | OUTPATIENT
Start: 2023-08-11 | End: 2023-08-12 | Stop reason: HOSPADM

## 2023-08-11 RX ORDER — SIMETHICONE 80 MG
1 TABLET,CHEWABLE ORAL 4 TIMES DAILY PRN
Status: DISCONTINUED | OUTPATIENT
Start: 2023-08-11 | End: 2023-08-12 | Stop reason: HOSPADM

## 2023-08-11 RX ORDER — POLYETHYLENE GLYCOL 3350 17 G/17G
17 POWDER, FOR SOLUTION ORAL DAILY
Status: DISCONTINUED | OUTPATIENT
Start: 2023-08-12 | End: 2023-08-12 | Stop reason: HOSPADM

## 2023-08-11 RX ORDER — HYDROCODONE BITARTRATE AND ACETAMINOPHEN 5; 325 MG/1; MG/1
1 TABLET ORAL EVERY 6 HOURS PRN
Qty: 12 TABLET | Refills: 0 | Status: SHIPPED | OUTPATIENT
Start: 2023-08-11 | End: 2023-08-27

## 2023-08-11 RX ORDER — GLUCAGON 1 MG
1 KIT INJECTION
Status: DISCONTINUED | OUTPATIENT
Start: 2023-08-11 | End: 2023-08-12 | Stop reason: HOSPADM

## 2023-08-11 RX ORDER — TALC
6 POWDER (GRAM) TOPICAL NIGHTLY PRN
Status: DISCONTINUED | OUTPATIENT
Start: 2023-08-11 | End: 2023-08-12 | Stop reason: HOSPADM

## 2023-08-11 RX ORDER — ONDANSETRON 2 MG/ML
4 INJECTION INTRAMUSCULAR; INTRAVENOUS
Status: COMPLETED | OUTPATIENT
Start: 2023-08-11 | End: 2023-08-11

## 2023-08-11 RX ORDER — LIDOCAINE 50 MG/G
1 PATCH TOPICAL DAILY
Qty: 15 PATCH | Refills: 0 | Status: SHIPPED | OUTPATIENT
Start: 2023-08-11 | End: 2023-08-27

## 2023-08-11 RX ORDER — MAG HYDROX/ALUMINUM HYD/SIMETH 200-200-20
30 SUSPENSION, ORAL (FINAL DOSE FORM) ORAL 4 TIMES DAILY PRN
Status: DISCONTINUED | OUTPATIENT
Start: 2023-08-11 | End: 2023-08-12 | Stop reason: HOSPADM

## 2023-08-11 RX ADMIN — ENOXAPARIN SODIUM 40 MG: 40 INJECTION SUBCUTANEOUS at 10:08

## 2023-08-11 RX ADMIN — IOHEXOL 75 ML: 350 INJECTION, SOLUTION INTRAVENOUS at 07:08

## 2023-08-11 RX ADMIN — SODIUM CHLORIDE 1000 ML: 9 INJECTION, SOLUTION INTRAVENOUS at 08:08

## 2023-08-11 RX ADMIN — ONDANSETRON 4 MG: 2 INJECTION INTRAMUSCULAR; INTRAVENOUS at 01:08

## 2023-08-11 RX ADMIN — MORPHINE SULFATE 4 MG: 4 INJECTION, SOLUTION INTRAMUSCULAR; INTRAVENOUS at 01:08

## 2023-08-11 RX ADMIN — HYDROCODONE BITARTRATE AND ACETAMINOPHEN 1 TABLET: 5; 325 TABLET ORAL at 04:08

## 2023-08-11 RX ADMIN — KETOROLAC TROMETHAMINE 15 MG: 30 INJECTION, SOLUTION INTRAMUSCULAR; INTRAVENOUS at 08:08

## 2023-08-11 NOTE — ED NOTES
"Chest pain with worsening symptoms since 0800 this morning. Pt reports having an syncopal episode yesterday and reports refusing to be brought in with EMS because "I didn't like the way I was being treated." Pt reports constant chest pain since but  trying to "sleep it off" until this am. Pt reports waking up and ambulating to the restroom when he experienced 10/10 chest pain and near syncopal episode. Pt states 10/10 midsternal chest pain but denies pain radiation, dizziness or sob.   "

## 2023-08-11 NOTE — Clinical Note
Diagnosis: Syncope [119339]   Future Attending Provider: IRAJ GIRALDO [[id number]]   Admitting Provider:: IRAJ GIRALDO [[id number]]   Special Needs:: Fall Risk [15]

## 2023-08-11 NOTE — ED PROVIDER NOTES
Encounter Date: 8/11/2023       History     Chief Complaint   Patient presents with    Chest Pain     PT reports had a syncope episode yesterday and called 911. EMS arrived and started and IV and took and EKG and instructed pt to hospital but pt refused. Pt reports CP started today around 8am. PT denies N/V, dizziness Pt rates pain 10/10     HPI    38-year-old male with past medical history of ADHD presents after syncope with chest pain.  Patient reports he was doing some job hunting earlier today, was walking around.  He reports having a little bit of some chest pain in the front and states that he subsequently passed out.  Reports hitting the left side of his face.  He reports since waking up having worsening chest pain.  He reports never having this issue previously.  He reports never passing out previously.  He reports no alcohol or drug use today, states he is had a remote history and has not used in a long time.  He reports not feeling lightheaded or dizzy prior to the event, denies any abdominal pain, reported little bit of some nausea when he came back to.  He reports possibly having a similar episode yesterday but refused to come to the emergency department.  He denies any recent illness, fevers/chills, cough, or any further associated symptoms.    Review of patient's allergies indicates:  No Known Allergies  Past Medical History:   Diagnosis Date    ADHD      No past surgical history on file.  Family History   Problem Relation Age of Onset    COPD Neg Hx     Drug abuse Neg Hx     Hyperlipidemia Neg Hx     Mental retardation Neg Hx      Social History     Tobacco Use    Smoking status: Every Day     Current packs/day: 0.25     Types: Cigarettes    Smokeless tobacco: Never    Tobacco comments:     two cigaretts a day   Substance Use Topics    Alcohol use: Yes     Comment: rarely    Drug use: Yes     Types: Marijuana     Comment: rarely     Review of Systems   Constitutional: Negative.    HENT: Negative.      Eyes: Negative.    Respiratory: Negative.     Cardiovascular:  Positive for chest pain.   Gastrointestinal: Negative.    Genitourinary: Negative.    Musculoskeletal: Negative.    Skin: Negative.    Neurological:  Positive for syncope.       Physical Exam     Initial Vitals [08/11/23 1303]   BP Pulse Resp Temp SpO2   (!) 139/107 78 18 97.7 °F (36.5 °C) 99 %      MAP       --         Physical Exam    Nursing note and vitals reviewed.  Constitutional: He appears well-developed. He is not diaphoretic. He appears distressed (mildly).   HENT:   Head: Normocephalic and atraumatic.   Nose: Nose normal.   Eyes: EOM are normal. Pupils are equal, round, and reactive to light.   Neck: Neck supple. No tracheal deviation present. No JVD present.   Normal range of motion.  Cardiovascular:  Normal rate, regular rhythm, normal heart sounds and intact distal pulses.           Pulmonary/Chest: Breath sounds normal. No respiratory distress. He has no wheezes. He has no rhonchi. He has no rales. He exhibits tenderness (ttp over left para-sternal chest wall).   Abdominal: Abdomen is soft. Bowel sounds are normal. He exhibits no distension. There is no abdominal tenderness. There is no rebound and no guarding.   Musculoskeletal:         General: No tenderness or edema. Normal range of motion.      Cervical back: Normal range of motion and neck supple.     Neurological: He is alert and oriented to person, place, and time. He has normal strength.   Skin: Skin is warm and dry. Capillary refill takes less than 2 seconds. No rash noted. No erythema.         ED Course   Procedures  Labs Reviewed   CBC W/ AUTO DIFFERENTIAL - Abnormal; Notable for the following components:       Result Value    Mono # 1.4 (*)     Mono % 18.1 (*)     All other components within normal limits   COMPREHENSIVE METABOLIC PANEL - Abnormal; Notable for the following components:    Anion Gap 7 (*)     All other components within normal limits   DRUG SCREEN PANEL, URINE  EMERGENCY - Abnormal; Notable for the following components:    THC Presumptive Positive (*)     All other components within normal limits    Narrative:     Specimen Source->Urine   TROPONIN I   B-TYPE NATRIURETIC PEPTIDE   ALCOHOL,MEDICAL (ETHANOL)   TROPONIN I        ECG Results              EKG 12-lead (Final result)  Result time 08/11/23 16:41:57      Final result by Interface, Lab In [facility] (08/11/23 16:41:57)                   Narrative:    Test Reason : R07.9,    Vent. Rate : 066 BPM     Atrial Rate : 066 BPM     P-R Int : 150 ms          QRS Dur : 090 ms      QT Int : 386 ms       P-R-T Axes : 067 041 054 degrees     QTc Int : 404 ms    Normal sinus rhythm  Normal ECG  When compared with ECG of 11-AUG-2023 12:59,  No significant change was found  Confirmed by Jeremías Springer MD ([id number]) on 8/11/2023 4:41:45 PM    Referred By: ANTHONY   SELF           Confirmed By:Jeremías Springer MD                                     EKG 12-lead (Final result)  Result time 08/11/23 16:41:48      Final result by Interface, Lab In [facility] (08/11/23 16:41:48)                   Narrative:    Test Reason : R07.9,    Vent. Rate : 077 BPM     Atrial Rate : 077 BPM     P-R Int : 164 ms          QRS Dur : 088 ms      QT Int : 362 ms       P-R-T Axes : 073 068 030 degrees     QTc Int : 409 ms    Normal sinus rhythm  Normal ECG  When compared with ECG of 02-JAN-2022 17:26,  No significant change was found  Confirmed by Jeremías Springer MD ([id number]) on 8/11/2023 4:41:40 PM    Referred By: [email or web address]   SELF           Confirmed By:Jeremías Springer MD                                  Imaging Results              US Lower Extremity Veins Bilateral (Final result)  Result time 08/11/23 20:56:25   Procedure changed from US Lower Extremity Veins Left     Final result by Saman Law MD (08/11/23 20:56:25)                   Impression:      No evidence of lower extremity deep venous thrombosis.      Electronically signed by: Saman Law  MD  Date:    08/11/2023  Time:    20:56               Narrative:    EXAMINATION:  US LOWER EXTREMITY VEINS BILATERAL    CLINICAL HISTORY:  CHEST PAIN; Syncope and collapse    TECHNIQUE:  Duplex and color flow Doppler evaluation of the bilateral lower extremity veins was performed.    COMPARISON:  None    FINDINGS:  No evidence of clot involving the bilateral common femoral, greater saphenous, femoral, popliteal, peroneal, anterior and posterior tibial veins.  All venous structures demonstrate normal respiratory phasicity and augment adequately.  No evidence of soft tissue mass or Baker's cyst.                                       CTA Head and Neck (xpd) (Final result)  Result time 08/11/23 20:50:55      Final result by Saman Law MD (08/11/23 20:50:55)                   Impression:      1. No acute intracranial abnormalities identified.  2. CTA head and neck with no evidence of high-grade stenosis, large vessel occlusion, or dissection.  3. Age indeterminate left nasal bone fracture.  No additional acute facial fractures identified.      Electronically signed by: Saman Law MD  Date:    08/11/2023  Time:    20:50               Narrative:    EXAMINATION:  CTA HEAD AND NECK (XPD); CT MAXILLOFACIAL WITHOUT CONTRAST    CLINICAL HISTORY:  CNS vasculitis, known or suspected;; Facial trauma;    TECHNIQUE:  Non contrast low dose axial images were obtained through the head.  CT angiogram was performed from the level of the briana to the top of the head following the IV administration of 75mL of Omnipaque 350.   Sagittal and coronal reconstructions and maximum intensity projection reconstructions were performed. Arterial stenosis percentages are based on NASCET measurement criteria.  Separate acquisition maxillofacial CT was also obtained without the use of IV contrast.    COMPARISON:  None    FINDINGS:  No evidence of acute/recent major vascular distribution cerebral infarction, intraparenchymal hemorrhage, or  intra-axial space occupying lesion. The ventricular system is normal in size and configuration with no evidence of hydrocephalus. No effacement of the skull-base cisterns. No abnormal extra-axial fluid collections or blood products.  Minimal bilateral maxillary sinus mucous membrane thickening is seen.  Remaining visualized paranasal sinuses and mastoid air cells are clear.  Orbits show no acute abnormalities.  Age indeterminate left nasal bone fracture is seen.  No additional acute facial fractures identified.  Bilateral broken posterior mandibular molars are seen with prominent periapical lucencies.  The calvarium shows no significant abnormality.    CTA Neck: The origins of the right brachiocephalic, left common carotid and left subclavian arteries from the arch are within normal limits.  The origins of the vertebral arteries are within normal limits.  The vertebral arteries are patent without evidence for focal stenosis or occlusion.   The bilateral common carotid arteries and internal carotid arteries are patent without evidence for focal stenosis or occlusion.  No evidence of carotid or vertebral artery dissection.    Anterior circulation: The bilateral distal cervical, petrous, cavernous, and supraclinoid ICAs are patent without significant stenosis or aneurysm.  Right JOSÉ MIGUEL A1 segment is hypoplastic.  The anterior and middle cerebral arteries are otherwise patent without significant stenosis, occlusion, or aneurysm.    Posterior circulation: Dominant left vertebral artery.  Right vertebral artery is non dominant and appears to terminate as a superior cerebellar branch.  Distal vertebral arteries, basilar artery and posterior cerebral arteries are patent without significant focal stenosis, occlusion, or aneurysm.  Small caliber vertebrobasilar system with bilateral posterior communicating arteries seen which partially supply the bilateral PCAs.    Airways: Unremarkable.    Glands/Nodes: The parotid,  submandibular, and thyroid glands are unremarkable.    Spine: No acute cervical spine abnormalities identified.    Lungs: Visualized lung apices are clear.                                       CT Maxillofacial Without Contrast (Final result)  Result time 08/11/23 20:50:55      Final result by Saman Law MD (08/11/23 20:50:55)                   Impression:      1. No acute intracranial abnormalities identified.  2. CTA head and neck with no evidence of high-grade stenosis, large vessel occlusion, or dissection.  3. Age indeterminate left nasal bone fracture.  No additional acute facial fractures identified.      Electronically signed by: Saman Law MD  Date:    08/11/2023  Time:    20:50               Narrative:    EXAMINATION:  CTA HEAD AND NECK (XPD); CT MAXILLOFACIAL WITHOUT CONTRAST    CLINICAL HISTORY:  CNS vasculitis, known or suspected;; Facial trauma;    TECHNIQUE:  Non contrast low dose axial images were obtained through the head.  CT angiogram was performed from the level of the briana to the top of the head following the IV administration of 75mL of Omnipaque 350.   Sagittal and coronal reconstructions and maximum intensity projection reconstructions were performed. Arterial stenosis percentages are based on NASCET measurement criteria.  Separate acquisition maxillofacial CT was also obtained without the use of IV contrast.    COMPARISON:  None    FINDINGS:  No evidence of acute/recent major vascular distribution cerebral infarction, intraparenchymal hemorrhage, or intra-axial space occupying lesion. The ventricular system is normal in size and configuration with no evidence of hydrocephalus. No effacement of the skull-base cisterns. No abnormal extra-axial fluid collections or blood products.  Minimal bilateral maxillary sinus mucous membrane thickening is seen.  Remaining visualized paranasal sinuses and mastoid air cells are clear.  Orbits show no acute abnormalities.  Age indeterminate  left nasal bone fracture is seen.  No additional acute facial fractures identified.  Bilateral broken posterior mandibular molars are seen with prominent periapical lucencies.  The calvarium shows no significant abnormality.    CTA Neck: The origins of the right brachiocephalic, left common carotid and left subclavian arteries from the arch are within normal limits.  The origins of the vertebral arteries are within normal limits.  The vertebral arteries are patent without evidence for focal stenosis or occlusion.   The bilateral common carotid arteries and internal carotid arteries are patent without evidence for focal stenosis or occlusion.  No evidence of carotid or vertebral artery dissection.    Anterior circulation: The bilateral distal cervical, petrous, cavernous, and supraclinoid ICAs are patent without significant stenosis or aneurysm.  Right JOSÉ MIGUEL A1 segment is hypoplastic.  The anterior and middle cerebral arteries are otherwise patent without significant stenosis, occlusion, or aneurysm.    Posterior circulation: Dominant left vertebral artery.  Right vertebral artery is non dominant and appears to terminate as a superior cerebellar branch.  Distal vertebral arteries, basilar artery and posterior cerebral arteries are patent without significant focal stenosis, occlusion, or aneurysm.  Small caliber vertebrobasilar system with bilateral posterior communicating arteries seen which partially supply the bilateral PCAs.    Airways: Unremarkable.    Glands/Nodes: The parotid, submandibular, and thyroid glands are unremarkable.    Spine: No acute cervical spine abnormalities identified.    Lungs: Visualized lung apices are clear.                                       X-Ray Chest AP Portable (Final result)  Result time 08/11/23 14:27:29      Final result by Cedric Barillas MD (08/11/23 14:27:29)                   Impression:      See above      Electronically signed by: Cedric Barillas  MD  Date:    08/11/2023  Time:    14:27               Narrative:    EXAMINATION:  XR CHEST AP PORTABLE    CLINICAL HISTORY:  Chest Pain;    TECHNIQUE:  Single frontal view of the chest was performed.    COMPARISON:  None    FINDINGS:  Heart size normal.  The lungs are clear.  No pleural effusion                                       Medications   morphine injection 4 mg (4 mg Intravenous Given 8/11/23 1336)   ondansetron injection 4 mg (4 mg Intravenous Given 8/11/23 1336)   HYDROcodone-acetaminophen 5-325 mg per tablet 1 tablet (1 tablet Oral Given 8/11/23 1632)   sodium chloride 0.9% bolus 1,000 mL 1,000 mL (0 mLs Intravenous Stopped 8/11/23 2116)   ketorolac injection 15 mg (15 mg Intravenous Given 8/11/23 2020)   iohexoL (OMNIPAQUE 350) injection 75 mL (75 mLs Intravenous Given 8/11/23 1948)                 ED Course as of 08/11/23 2201   Fri Aug 11, 2023   1301 My independent interpretation of the triage EKG is sinus rhythm at a rate of 80.  There is no ST segment elevation or depression concerning for acute occlusive infarct.  QT corrected is normal.  When compared to EKGs performed outside of the CT visits there is no acute changes. []   1633 Assumed care: trop #2 pending. Syncope x 2.  On my exam the patient is alert and oriented.  Regular rate and rhythm.  Unlabored respirations.  Moving all extremities equally.  He does have pain throughout his left face but no evidence of bruising or contusion..  I did not appreciate a bruit. []   1634 Chest pain x 1 hour.  Syncope after CP. []   1930 Will place the patient in observation for high-risk syncope.  He reports pain to the left side of his face.  I have ordered imaging of the max face to rule out fracture as well as CTA of the head of the neck due to the hypesthesias noted on the face []   2029 Discussed with hospitalist; will place in observation []      ED Course User Index  [] Erika Estrada MD         MDM:    38-year-old male with past  medical history of ADHD presents after syncope with chest pain.  Physical exam as noted above.  ED workup notable for EKG as noted above, CBC/CMP within normal limits, troponin negative, BNP negative, alcohol negative, UDS with THC positive, chest x-ray unremarkable.  Patient presentation concerning for syncope with resulting chest pain, some nonspecific EKG findings today, consistent with prior, do not suspect acute cardiac event.               Clinical Impression:   Final diagnoses:  [R07.9] Chest pain (Primary)  [R55] Syncope, unspecified syncope type  [R55] Syncope        ED Disposition Condition    Observation                 Erika Estrada MD  08/11/23 0231

## 2023-08-12 VITALS
WEIGHT: 190.06 LBS | SYSTOLIC BLOOD PRESSURE: 120 MMHG | HEIGHT: 73 IN | OXYGEN SATURATION: 99 % | DIASTOLIC BLOOD PRESSURE: 84 MMHG | BODY MASS INDEX: 25.19 KG/M2 | RESPIRATION RATE: 18 BRPM | HEART RATE: 63 BPM | TEMPERATURE: 98 F

## 2023-08-12 PROBLEM — R55 SYNCOPE AND COLLAPSE: Status: ACTIVE | Noted: 2023-08-12

## 2023-08-12 LAB
ASCENDING AORTA: 3.28 CM
AV INDEX (PROSTH): 0.76
AV MEAN GRADIENT: 4 MMHG
AV PEAK GRADIENT: 7 MMHG
AV VALVE AREA BY VELOCITY RATIO: 3.56 CM²
AV VALVE AREA: 3.56 CM²
AV VELOCITY RATIO: 0.76
BSA FOR ECHO PROCEDURE: 2.39 M2
CHOLEST SERPL-MCNC: 105 MG/DL (ref 120–199)
CHOLEST/HDLC SERPL: 3.6 {RATIO} (ref 2–5)
CV ECHO LV RWT: 0.39 CM
DOP CALC AO PEAK VEL: 1.31 M/S
DOP CALC AO VTI: 26.2 CM
DOP CALC LVOT AREA: 4.7 CM2
DOP CALC LVOT DIAMETER: 2.45 CM
DOP CALC LVOT PEAK VEL: 0.99 M/S
DOP CALC LVOT STROKE VOLUME: 93.3 CM3
DOP CALCLVOT PEAK VEL VTI: 19.8 CM
E WAVE DECELERATION TIME: 188.73 MSEC
E/A RATIO: 1.13
E/E' RATIO: 8.1 M/S
ECHO LV POSTERIOR WALL: 1.02 CM (ref 0.6–1.1)
FRACTIONAL SHORTENING: 39 % (ref 28–44)
HDLC SERPL-MCNC: 29 MG/DL (ref 40–75)
HDLC SERPL: 27.6 % (ref 20–50)
INTERVENTRICULAR SEPTUM: 1.09 CM (ref 0.6–1.1)
IVC DIAMETER: 1.5 CM
IVRT: 97.05 MSEC
LA MAJOR: 4.3 CM
LA MINOR: 3.86 CM
LA WIDTH: 3.9 CM
LDLC SERPL CALC-MCNC: 58.4 MG/DL (ref 63–159)
LEFT ATRIUM SIZE: 2.8 CM
LEFT ATRIUM VOLUME INDEX: 17.9 ML/M2
LEFT ATRIUM VOLUME: 37.76 CM3
LEFT INTERNAL DIMENSION IN SYSTOLE: 3.18 CM (ref 2.1–4)
LEFT VENTRICLE DIASTOLIC VOLUME INDEX: 61.49 ML/M2
LEFT VENTRICLE DIASTOLIC VOLUME: 129.75 ML
LEFT VENTRICLE MASS INDEX: 99 G/M2
LEFT VENTRICLE SYSTOLIC VOLUME INDEX: 19.1 ML/M2
LEFT VENTRICLE SYSTOLIC VOLUME: 40.39 ML
LEFT VENTRICULAR INTERNAL DIMENSION IN DIASTOLE: 5.2 CM (ref 3.5–6)
LEFT VENTRICULAR MASS: 208.61 G
LV LATERAL E/E' RATIO: 7.73 M/S
LV SEPTAL E/E' RATIO: 8.5 M/S
LVOT MG: 2.05 MMHG
LVOT MV: 0.66 CM/S
MV PEAK A VEL: 0.75 M/S
MV PEAK E VEL: 0.85 M/S
MV STENOSIS PRESSURE HALF TIME: 54.73 MS
MV VALVE AREA P 1/2 METHOD: 4.02 CM2
NONHDLC SERPL-MCNC: 76 MG/DL
OHS CV RV/LV RATIO: 0.58 CM
PISA TR MAX VEL: 2.06 M/S
PULM VEIN S/D RATIO: 1.05
PV PEAK D VEL: 0.39 M/S
PV PEAK GRADIENT: 2 MMHG
PV PEAK S VEL: 0.41 M/S
PV PEAK VELOCITY: 0.74 M/S
RA MAJOR: 4.25 CM
RA PRESSURE ESTIMATED: 8 MMHG
RA WIDTH: 2.94 CM
RIGHT VENTRICLE ID DIMENSION: 3 CM
RIGHT VENTRICULAR END-DIASTOLIC DIMENSION: 2.97 CM
RV TB RVSP: 10 MMHG
SINUS: 3.62 CM
STJ: 3.04 CM
TDI LATERAL: 0.11 M/S
TDI SEPTAL: 0.1 M/S
TDI: 0.11 M/S
TR MAX PG: 17 MMHG
TRICUSPID ANNULAR PLANE SYSTOLIC EXCURSION: 2.05 CM
TRIGL SERPL-MCNC: 88 MG/DL (ref 30–150)
TROPONIN I SERPL DL<=0.01 NG/ML-MCNC: 0.01 NG/ML (ref 0–0.03)
TV PEAK GRADIENT: 1 MMHG
TV REST PULMONARY ARTERY PRESSURE: 25 MMHG
Z-SCORE OF LEFT VENTRICULAR DIMENSION IN END DIASTOLE: -2.4
Z-SCORE OF LEFT VENTRICULAR DIMENSION IN END SYSTOLE: -1.89

## 2023-08-12 PROCEDURE — 80061 LIPID PANEL: CPT | Performed by: HOSPITALIST

## 2023-08-12 PROCEDURE — 99204 OFFICE O/P NEW MOD 45 MIN: CPT | Mod: 25,,, | Performed by: INTERNAL MEDICINE

## 2023-08-12 PROCEDURE — 84484 ASSAY OF TROPONIN QUANT: CPT | Performed by: HOSPITALIST

## 2023-08-12 PROCEDURE — G0378 HOSPITAL OBSERVATION PER HR: HCPCS

## 2023-08-12 PROCEDURE — 63600175 PHARM REV CODE 636 W HCPCS: Performed by: INTERNAL MEDICINE

## 2023-08-12 PROCEDURE — 99204 PR OFFICE/OUTPT VISIT, NEW, LEVL IV, 45-59 MIN: ICD-10-PCS | Mod: 25,,, | Performed by: INTERNAL MEDICINE

## 2023-08-12 PROCEDURE — 96376 TX/PRO/DX INJ SAME DRUG ADON: CPT

## 2023-08-12 RX ORDER — KETOROLAC TROMETHAMINE 30 MG/ML
15 INJECTION, SOLUTION INTRAMUSCULAR; INTRAVENOUS ONCE
Status: COMPLETED | OUTPATIENT
Start: 2023-08-12 | End: 2023-08-12

## 2023-08-12 RX ADMIN — KETOROLAC TROMETHAMINE 15 MG: 30 INJECTION, SOLUTION INTRAMUSCULAR; INTRAVENOUS at 11:08

## 2023-08-12 NOTE — NURSING
Discharge instructions given to patient and family at bedside. Patient and family verbalized an understanding and states a willingness to comply. Saline lock removed. Tele monitoring removed.

## 2023-08-12 NOTE — SUBJECTIVE & OBJECTIVE
Past Medical History:   Diagnosis Date    ADHD        No past surgical history on file.    Review of patient's allergies indicates:  No Known Allergies    No current facility-administered medications on file prior to encounter.     No current outpatient medications on file prior to encounter.     Family History    None       Tobacco Use    Smoking status: Every Day     Current packs/day: 0.25     Types: Cigarettes    Smokeless tobacco: Never    Tobacco comments:     two cigaretts a day   Substance and Sexual Activity    Alcohol use: Yes     Comment: rarely    Drug use: Yes     Types: Marijuana     Comment: rarely    Sexual activity: Yes     Partners: Female     Review of Systems   Constitutional: Negative for chills, diaphoresis, fever and malaise/fatigue.   HENT:  Negative for nosebleeds.    Eyes:  Negative for blurred vision and double vision.   Cardiovascular:  Positive for chest pain and syncope. Negative for claudication, cyanosis, dyspnea on exertion, leg swelling, orthopnea, palpitations and paroxysmal nocturnal dyspnea.   Respiratory:  Negative for cough, shortness of breath and wheezing.    Skin:  Negative for dry skin and poor wound healing.   Musculoskeletal:  Negative for back pain, joint swelling and myalgias.   Gastrointestinal:  Negative for abdominal pain, nausea and vomiting.   Genitourinary:  Negative for hematuria.   Neurological:  Negative for dizziness, headaches, numbness, seizures and weakness.   Psychiatric/Behavioral:  Negative for altered mental status and depression.      Objective:     Vital Signs (Most Recent):  Temp: 98.1 °F (36.7 °C) (08/12/23 0741)  Pulse: 70 (08/12/23 0741)  Resp: 20 (08/12/23 0741)  BP: 128/84 (08/12/23 0741)  SpO2: 100 % (08/12/23 0741) Vital Signs (24h Range):  Temp:  [97.4 °F (36.3 °C)-98.3 °F (36.8 °C)] 98.1 °F (36.7 °C)  Pulse:  [60-78] 70  Resp:  [13-23] 20  SpO2:  [98 %-100 %] 100 %  BP: (117-146)/() 128/84     Weight: 86.2 kg (190 lb 0.6 oz)  Body mass  index is 25.07 kg/m².    SpO2: 100 %       No intake or output data in the 24 hours ending 08/12/23 1043    Lines/Drains/Airways       Peripheral Intravenous Line  Duration                  Peripheral IV - Single Lumen 08/11/23 1336 20 G Anterior;Right Forearm <1 day                     Physical Exam  Constitutional:       General: He is not in acute distress.     Appearance: Normal appearance. He is well-developed and normal weight. He is not ill-appearing, toxic-appearing or diaphoretic.   HENT:      Head: Normocephalic and atraumatic.   Eyes:      General: No scleral icterus.     Extraocular Movements: Extraocular movements intact.      Conjunctiva/sclera: Conjunctivae normal.      Pupils: Pupils are equal, round, and reactive to light.   Neck:      Thyroid: No thyromegaly.      Vascular: No JVD.      Trachea: No tracheal deviation.   Cardiovascular:      Rate and Rhythm: Normal rate and regular rhythm.      Heart sounds: S1 normal and S2 normal. No murmur heard.     No friction rub. No gallop.   Pulmonary:      Effort: Pulmonary effort is normal. No respiratory distress.      Breath sounds: Normal breath sounds. No stridor. No wheezing, rhonchi or rales.   Chest:      Chest wall: Tenderness present.   Abdominal:      General: There is no distension.      Palpations: Abdomen is soft.   Musculoskeletal:         General: No swelling or tenderness. Normal range of motion.      Cervical back: Normal range of motion and neck supple. No rigidity.      Right lower leg: No edema.      Left lower leg: No edema.   Skin:     General: Skin is warm and dry.      Coloration: Skin is not jaundiced.   Neurological:      General: No focal deficit present.      Mental Status: He is alert and oriented to person, place, and time.      Cranial Nerves: No cranial nerve deficit.   Psychiatric:         Mood and Affect: Mood normal.         Behavior: Behavior normal.          Current Medications:   enoxparin  40 mg Subcutaneous Daily     polyethylene glycol  17 g Oral Daily       acetaminophen, aluminum-magnesium hydroxide-simethicone, glucagon (human recombinant), glucose, glucose, melatonin, naloxone, ondansetron, prochlorperazine, simethicone, sodium chloride 0.9%    Laboratory (all labs reviewed):  CBC:  Recent Labs   Lab 08/11/23  1335   WBC 7.88   Hemoglobin 14.5   Hematocrit 44.1   Platelets 287       CHEMISTRIES:  Recent Labs   Lab 08/11/23  1335   Glucose 81   Sodium 142   Potassium 3.9   BUN 18   Creatinine 1.0   eGFR >60   Calcium 9.5       CARDIAC BIOMARKERS:  Recent Labs   Lab 08/11/23  1335 08/11/23  1545 08/12/23  0610   Troponin I <0.006 <0.006 0.007       COAGS:        LIPIDS/LFTS:  Recent Labs   Lab 08/11/23  1335 08/12/23  0610   Cholesterol  --  105 L   Triglycerides  --  88   HDL  --  29 L   LDL Cholesterol  --  58.4 L   Non-HDL Cholesterol  --  76   AST 14  --    ALT 10  --        BNP:  Recent Labs   Lab 08/11/23  1335   BNP <10       TSH:        Free T4:        Diagnostic Results:  ECG (personally reviewed and interpreted tracing(s)):  8/11/23 1321 SR 66    Chest X-Ray (personally reviewed and interpreted image(s)): 8/11/23 NAD    CTA head/neck 8/11/23  1. No acute intracranial abnormalities identified.  2. CTA head and neck with no evidence of high-grade stenosis, large vessel occlusion, or dissection.  3. Age indeterminate left nasal bone fracture.  No additional acute facial fractures identified.    Echo: 8/12/23 (images personally reviewed and interpreted)    Left Ventricle: The left ventricle is normal in size. Normal wall thickness. There is concentric remodeling. Normal wall motion. There is low normal systolic function with a visually estimated ejection fraction of 50 - 55%. There is normal diastolic function.    Right Ventricle: Normal right ventricular cavity size. Wall thickness is normal. Right ventricle wall motion  is normal. Systolic function is normal.

## 2023-08-12 NOTE — PLAN OF CARE
Case Management Assessment     PCP: [facility] Colunga  Pharmacy: [facility]  Patient Arrived From: Home  Existing Help at Home: Grandmother  Barriers to Discharge: None    Discharge Plan:    A. Home with family;  follow-up   B. TBD     08/12/23 0816   Discharge Assessment   Assessment Type Discharge Planning Assessment   Confirmed/corrected address, phone number and insurance Yes   Confirmed Demographics Correct on Facesheet   Source of Information patient;health record   Does patient/caregiver understand observation status Yes   Communicated VIC with patient/caregiver Date not available/Unable to determine   Reason For Admission Syncope   People in Home significant other   Facility Arrived From: Home   Do you expect to return to your current living situation? Yes   Do you have help at home or someone to help you manage your care at home? Yes   Who are your caregiver(s) and their phone number(s)? EdgarSO: [phone]; grandmother   Prior to hospitilization cognitive status: Alert/Oriented   Current cognitive status: Alert/Oriented   Home Accessibility wheelchair accessible   Home Layout Able to live on 1st floor   Equipment Currently Used at Home none   Readmission within 30 days? No   Patient currently being followed by outpatient case management? No   Do you currently have service(s) that help you manage your care at home? No   Do you take prescription medications? Yes   Do you have prescription coverage? Yes   Coverage Medicaid   Do you have any problems affording any of your prescribed medications? No   Is the patient taking medications as prescribed? yes   Who is going to help you get home at discharge? [place]; grandmother   How do you get to doctors appointments? public transportation   Are you on dialysis? No   Do you take coumadin? No   Discharge Plan A Home with family  (Follow-up, establish [facility] )   Discharge Plan B Other  (TBD)   DME Needed Upon Discharge   other (see comments)  (TBD)   Discharge Plan discussed with: Patient   Transition of Care Barriers None     SW Role explained to patient; two patient identifiers recognized; SW contact information placed on Communication board. Discussed patient managing health care at home and discussed discharge plans A and B; determined who would be helping patient at home with recovery: A[place]-SO and grandmother will help with recovery at home.

## 2023-08-12 NOTE — PLAN OF CARE
Patient is ready and clear for discharge from Case Management's perspective; informed patient's nurse, Ross. Discussed and provided patient with written discharge instruction and education.

## 2023-08-12 NOTE — ASSESSMENT & PLAN NOTE
Two days prior to admission.    No events on telemetry.    Outpatient event monitor will be ordered.

## 2023-08-12 NOTE — NURSING
[facility]  Nurses Note -- 4 Eyes      8/11/2023       Skin assessed on: Admit      [x] No Pressure Injuries Present    []Prevention Measures Documented    [] Yes LDA  for Pressure Injury Previously documented     [] Yes New Pressure Injury Discovered   [] LDA for New Pressure Injury Added      Attending RN:  Telly Man RN     Second RN:  ELKE Wilson

## 2023-08-12 NOTE — SUBJECTIVE & OBJECTIVE
Past Medical History:   Diagnosis Date    ADHD        No past surgical history on file.    Review of patient's allergies indicates:  No Known Allergies    No current facility-administered medications on file prior to encounter.     Current Outpatient Medications on File Prior to Encounter   Medication Sig    [DISCONTINUED] albuterol (PROVENTIL/VENTOLIN HFA) 90 mcg/actuation inhaler Inhale 1-2 puffs into the lungs every 6 (six) hours as needed. Rescue    [DISCONTINUED] cetirizine (ZYRTEC) 10 MG tablet Take 1 tablet (10 mg total) by mouth once daily. for 5 days    [DISCONTINUED] ibuprofen (ADVIL,MOTRIN) 600 MG tablet Take 1 tablet (600 mg total) by mouth every 6 (six) hours as needed for Pain.    [DISCONTINUED] ibuprofen (ADVIL,MOTRIN) 600 MG tablet Take 1 tablet (600 mg total) by mouth every 6 (six) hours as needed for Pain.    [DISCONTINUED] ibuprofen (ADVIL,MOTRIN) 600 MG tablet Take 1 tablet (600 mg total) by mouth every 6 (six) hours as needed for Pain.    [DISCONTINUED] ibuprofen (ADVIL,MOTRIN) 600 MG tablet Take 1 tablet (600 mg total) by mouth every 6 (six) hours as needed for Pain.     Family History    None       Tobacco Use    Smoking status: Every Day     Current packs/day: 0.25     Types: Cigarettes    Smokeless tobacco: Never    Tobacco comments:     two cigaretts a day   Substance and Sexual Activity    Alcohol use: Yes     Comment: rarely    Drug use: Yes     Types: Marijuana     Comment: rarely    Sexual activity: Yes     Partners: Female     Review of Systems   Constitutional:  Negative for chills and fever.   HENT:  Negative for congestion and rhinorrhea.    Eyes:  Negative for photophobia and visual disturbance.   Respiratory:  Negative for cough and shortness of breath.    Cardiovascular:  Positive for chest pain. Negative for palpitations and leg swelling.   Gastrointestinal:  Negative for abdominal pain, diarrhea, nausea and vomiting.   Genitourinary:  Negative for frequency, hematuria and  urgency.   Skin:  Negative for pallor, rash and wound.   Neurological:  Positive for syncope. Negative for light-headedness and headaches.   Psychiatric/Behavioral:  Negative for confusion and decreased concentration.      Objective:     Vital Signs (Most Recent):  Temp: 97.4 °F (36.3 °C) (08/11/23 2235)  Pulse: 60 (08/11/23 2235)  Resp: 17 (08/11/23 2235)  BP: (!) 141/96 (08/11/23 2235)  SpO2: 99 % (08/11/23 2235) Vital Signs (24h Range):  Temp:  [97.4 °F (36.3 °C)-98.3 °F (36.8 °C)] 97.4 °F (36.3 °C)  Pulse:  [60-78] 60  Resp:  [13-23] 17  SpO2:  [98 %-100 %] 99 %  BP: (117-146)/() 141/96     Weight: 86.2 kg (190 lb 0.6 oz)  Body mass index is 25.07 kg/m².     Physical Exam  Vitals and nursing note reviewed.   Constitutional:       General: He is not in acute distress.     Appearance: He is well-developed.   HENT:      Head: Normocephalic.      Right Ear: External ear normal.      Left Ear: External ear normal.   Eyes:      Conjunctiva/sclera: Conjunctivae normal.   Cardiovascular:      Rate and Rhythm: Normal rate and regular rhythm.   Pulmonary:      Effort: Pulmonary effort is normal. No respiratory distress.   Abdominal:      General: There is no distension.      Palpations: Abdomen is soft.   Musculoskeletal:         General: Normal range of motion.   Skin:     General: Skin is warm and dry.   Neurological:      Mental Status: He is alert and oriented to person, place, and time.   Psychiatric:         Thought Content: Thought content normal.                Significant Labs: All pertinent labs within the past 24 hours have been reviewed.    Significant Imaging: I have reviewed all pertinent imaging results/findings within the past 24 hours.

## 2023-08-12 NOTE — PLAN OF CARE
08/12/23 0871   Post-Acute Status   Post-Acute Authorization Other  (Home with family; [place] follow-up resources)   Coverage Medicaid   Other Status No Post-Acute Service Needs   Hospital Resources/Appts/Education Provided Provided patient/caregiver with written discharge plan information;Appointments scheduled by Navigator/Coordinator;Provided education on problems/symptoms using teachback;Appointments scheduled and added to AVS   Discharge Delays None known at this time   Discharge Plan   Discharge Plan A Home with family  (Follow-up)   Discharge Plan B Other  (TBD)

## 2023-08-12 NOTE — CONSULTS
[facility] - Telemetry  Cardiology  Consult Note    Patient Name: Johan Kaufman  MRN: [id number]  Admission Date: 8/11/2023  Hospital Length of Stay: 0 days  Code Status: Full Code   Attending Provider: Trudy Pitts DO   Consulting Provider: Jeremías Springer MD  Primary Care Physician: [facility]  Principal Problem:Chest pain    Patient information was obtained from patient and ER records.     Inpatient consult to Cardiology  Consult performed by: Jeremías Springer MD  Consult ordered by: rTudy Pitts DO  Reason for consult: CP/LOC        Subjective:     Chief Complaint:  CP     HPI:   38 y.o. male with no previously diagnosed health problems presents with a complaint of chest pain.  Acute onset, duration 2 days, associated with a reported episode of syncope, located left chest, no known exacerbating or alleviating factors.  Denies fever, chills, cough, SOB, palpitations, nausea, vomiting, diarrhea, or abdominal pain.  In the ED, EKG NSR without evidence of acute ischemia, troponin negative x2, routine labs, chest x-ray unremarkable for acute abnormality.  Tox screen positive for THC.  Bilateral lower extremity ultrasounds negative.  CTA head and neck and CT maxillofacial with the only acute abnormality of age-indeterminate left nasal bone fracture.  Placed in observation.    Cardiology consulted for CP/LOC.    The patient is a very pleasant 38-year-old man presenting with chest pain.  He describes an episode of syncope with facial trauma 2 days prior to admission.  Yesterday, he developed sudden onset of left-sided chest discomfort which he described as tightness.  However, this would occur when he rolls on his left side, and is reproducible on palpation of his chest wall.  His troponins are negative, EKG is normal, and echocardiogram does not reveal any wall motion abnormality.  I do not feel that this represents ACS.  I think outpatient testing is reasonable.  The patient is  agreeable.      Past Medical History:   Diagnosis Date    ADHD        No past surgical history on file.    Review of patient's allergies indicates:  No Known Allergies    No current facility-administered medications on file prior to encounter.     No current outpatient medications on file prior to encounter.     Family History    None       Tobacco Use    Smoking status: Every Day     Current packs/day: 0.25     Types: Cigarettes    Smokeless tobacco: Never    Tobacco comments:     two cigaretts a day   Substance and Sexual Activity    Alcohol use: Yes     Comment: rarely    Drug use: Yes     Types: Marijuana     Comment: rarely    Sexual activity: Yes     Partners: Female     Review of Systems   Constitutional: Negative for chills, diaphoresis, fever and malaise/fatigue.   HENT:  Negative for nosebleeds.    Eyes:  Negative for blurred vision and double vision.   Cardiovascular:  Positive for chest pain and syncope. Negative for claudication, cyanosis, dyspnea on exertion, leg swelling, orthopnea, palpitations and paroxysmal nocturnal dyspnea.   Respiratory:  Negative for cough, shortness of breath and wheezing.    Skin:  Negative for dry skin and poor wound healing.   Musculoskeletal:  Negative for back pain, joint swelling and myalgias.   Gastrointestinal:  Negative for abdominal pain, nausea and vomiting.   Genitourinary:  Negative for hematuria.   Neurological:  Negative for dizziness, headaches, numbness, seizures and weakness.   Psychiatric/Behavioral:  Negative for altered mental status and depression.      Objective:     Vital Signs (Most Recent):  Temp: 98.1 °F (36.7 °C) (08/12/23 0741)  Pulse: 70 (08/12/23 0741)  Resp: 20 (08/12/23 0741)  BP: 128/84 (08/12/23 0741)  SpO2: 100 % (08/12/23 0741) Vital Signs (24h Range):  Temp:  [97.4 °F (36.3 °C)-98.3 °F (36.8 °C)] 98.1 °F (36.7 °C)  Pulse:  [60-78] 70  Resp:  [13-23] 20  SpO2:  [98 %-100 %] 100 %  BP: (117-146)/() 128/84     Weight: 86.2 kg  (190 lb 0.6 oz)  Body mass index is 25.07 kg/m².    SpO2: 100 %       No intake or output data in the 24 hours ending 08/12/23 1043    Lines/Drains/Airways       Peripheral Intravenous Line  Duration                  Peripheral IV - Single Lumen 08/11/23 1336 20 G Anterior;Right Forearm <1 day                     Physical Exam  Constitutional:       General: He is not in acute distress.     Appearance: Normal appearance. He is well-developed and normal weight. He is not ill-appearing, toxic-appearing or diaphoretic.   HENT:      Head: Normocephalic and atraumatic.   Eyes:      General: No scleral icterus.     Extraocular Movements: Extraocular movements intact.      Conjunctiva/sclera: Conjunctivae normal.      Pupils: Pupils are equal, round, and reactive to light.   Neck:      Thyroid: No thyromegaly.      Vascular: No JVD.      Trachea: No tracheal deviation.   Cardiovascular:      Rate and Rhythm: Normal rate and regular rhythm.      Heart sounds: S1 normal and S2 normal. No murmur heard.     No friction rub. No gallop.   Pulmonary:      Effort: Pulmonary effort is normal. No respiratory distress.      Breath sounds: Normal breath sounds. No stridor. No wheezing, rhonchi or rales.   Chest:      Chest wall: Tenderness present.   Abdominal:      General: There is no distension.      Palpations: Abdomen is soft.   Musculoskeletal:         General: No swelling or tenderness. Normal range of motion.      Cervical back: Normal range of motion and neck supple. No rigidity.      Right lower leg: No edema.      Left lower leg: No edema.   Skin:     General: Skin is warm and dry.      Coloration: Skin is not jaundiced.   Neurological:      General: No focal deficit present.      Mental Status: He is alert and oriented to person, place, and time.      Cranial Nerves: No cranial nerve deficit.   Psychiatric:         Mood and Affect: Mood normal.         Behavior: Behavior normal.          Current Medications:   enoxparin   40 mg Subcutaneous Daily    polyethylene glycol  17 g Oral Daily       acetaminophen, aluminum-magnesium hydroxide-simethicone, glucagon (human recombinant), glucose, glucose, melatonin, naloxone, ondansetron, prochlorperazine, simethicone, sodium chloride 0.9%    Laboratory (all labs reviewed):  CBC:  Recent Labs   Lab 08/11/23  1335   WBC 7.88   Hemoglobin 14.5   Hematocrit 44.1   Platelets 287       CHEMISTRIES:  Recent Labs   Lab 08/11/23  1335   Glucose 81   Sodium 142   Potassium 3.9   BUN 18   Creatinine 1.0   eGFR >60   Calcium 9.5       CARDIAC BIOMARKERS:  Recent Labs   Lab 08/11/23  1335 08/11/23  1545 08/12/23  0610   Troponin I <0.006 <0.006 0.007       COAGS:        LIPIDS/LFTS:  Recent Labs   Lab 08/11/23  1335 08/12/23  0610   Cholesterol  --  105 L   Triglycerides  --  88   HDL  --  29 L   LDL Cholesterol  --  58.4 L   Non-HDL Cholesterol  --  76   AST 14  --    ALT 10  --        BNP:  Recent Labs   Lab 08/11/23  1335   BNP <10       TSH:        Free T4:        Diagnostic Results:  ECG (personally reviewed and interpreted tracing(s)):  8/11/23 1321 SR 66    Chest X-Ray (personally reviewed and interpreted image(s)): 8/11/23 NAD    CTA head/neck 8/11/23  1. No acute intracranial abnormalities identified.  2. CTA head and neck with no evidence of high-grade stenosis, large vessel occlusion, or dissection.  3. Age indeterminate left nasal bone fracture.  No additional acute facial fractures identified.    Echo: 8/12/23 (images personally reviewed and interpreted)    Left Ventricle: The left ventricle is normal in size. Normal wall thickness. There is concentric remodeling. Normal wall motion. There is low normal systolic function with a visually estimated ejection fraction of 50 - 55%. There is normal diastolic function.    Right Ventricle: Normal right ventricular cavity size. Wall thickness is normal. Right ventricle wall motion  is normal. Systolic function is normal.          Assessment and  Plan:     * Chest pain  Reproducible/positional.    Troponins negative, EKG normal, echo without wall motion abnormality.    ACS unlikely.    Toradol ordered.  Okay for discharge from a cardiovascular perspective and follow up with me as an outpatient for testing (stress test, event monitor).    Syncope and collapse  Two days prior to admission.    No events on telemetry.    Outpatient event monitor will be ordered.        VTE Risk Mitigation (From admission, onward)         Ordered     enoxaparin injection 40 mg  Daily         08/11/23 2224     IP VTE HIGH RISK PATIENT  Once         08/11/23 2224     Place sequential compression device  Until discontinued         08/11/23 2224              Dispo planning appropriate.  OK for discharge from CV perspective.  Pt to follow up with me after discharge.    Thank you for your consult. I will follow-up with patient. Please contact us if you have any additional questions.    Jeremías Springer MD  Cardiology   [facility] - Telemetry

## 2023-08-12 NOTE — H&P
[facility] Medicine  History & Physical    Patient Name: Johan Kaufman  MRN: [id number]  Patient Class: OP- Observation  Admission Date: 8/11/2023  Attending Physician: Arsalan Kaufman, RN   Primary Care Provider: Terrance Wu MD         Patient information was obtained from patient, past medical records and ER records.     Subjective:     Principal Problem:Chest pain    Chief Complaint:   Chief Complaint   Patient presents with    Chest Pain     PT reports had a syncope episode yesterday and called 911. EMS arrived and started and IV and took and EKG and instructed pt to hospital but pt refused. Pt reports CP started today around 8am. PT denies N/V, dizziness Pt rates pain 10/10        HPI: 38 y.o. male with no previously diagnosed health problems presents with a complaint of chest pain.  Acute onset, duration 2 days, associated with a reported episode of syncope, located left chest, no known exacerbating or alleviating factors.  Denies fever, chills, cough, SOB, palpitations, nausea, vomiting, diarrhea, or abdominal pain.  In the ED, EKG NSR without evidence of acute ischemia, troponin negative x2, routine labs, chest x-ray unremarkable for acute abnormality.  Tox screen positive for THC.  Bilateral lower extremity ultrasounds negative.  CTA head and neck and CT maxillofacial with the only acute abnormality of age-indeterminate left nasal bone fracture.  Placed in observation.      Past Medical History:   Diagnosis Date    ADHD        No past surgical history on file.    Review of patient's allergies indicates:  No Known Allergies    No current facility-administered medications on file prior to encounter.     Current Outpatient Medications on File Prior to Encounter   Medication Sig    [DISCONTINUED] albuterol (PROVENTIL/VENTOLIN HFA) 90 mcg/actuation inhaler Inhale 1-2 puffs into the lungs every 6 (six) hours as needed. Rescue    [DISCONTINUED] cetirizine (ZYRTEC) 10 MG tablet Take  1 tablet (10 mg total) by mouth once daily. for 5 days    [DISCONTINUED] ibuprofen (ADVIL,MOTRIN) 600 MG tablet Take 1 tablet (600 mg total) by mouth every 6 (six) hours as needed for Pain.    [DISCONTINUED] ibuprofen (ADVIL,MOTRIN) 600 MG tablet Take 1 tablet (600 mg total) by mouth every 6 (six) hours as needed for Pain.    [DISCONTINUED] ibuprofen (ADVIL,MOTRIN) 600 MG tablet Take 1 tablet (600 mg total) by mouth every 6 (six) hours as needed for Pain.    [DISCONTINUED] ibuprofen (ADVIL,MOTRIN) 600 MG tablet Take 1 tablet (600 mg total) by mouth every 6 (six) hours as needed for Pain.     Family History    None       Tobacco Use    Smoking status: Every Day     Current packs/day: 0.25     Types: Cigarettes    Smokeless tobacco: Never    Tobacco comments:     two cigaretts a day   Substance and Sexual Activity    Alcohol use: Yes     Comment: rarely    Drug use: Yes     Types: Marijuana     Comment: rarely    Sexual activity: Yes     Partners: Female     Review of Systems   Constitutional:  Negative for chills and fever.   HENT:  Negative for congestion and rhinorrhea.    Eyes:  Negative for photophobia and visual disturbance.   Respiratory:  Negative for cough and shortness of breath.    Cardiovascular:  Positive for chest pain. Negative for palpitations and leg swelling.   Gastrointestinal:  Negative for abdominal pain, diarrhea, nausea and vomiting.   Genitourinary:  Negative for frequency, hematuria and urgency.   Skin:  Negative for pallor, rash and wound.   Neurological:  Positive for syncope. Negative for light-headedness and headaches.   Psychiatric/Behavioral:  Negative for confusion and decreased concentration.      Objective:     Vital Signs (Most Recent):  Temp: 97.4 °F (36.3 °C) (08/11/23 2235)  Pulse: 60 (08/11/23 2235)  Resp: 17 (08/11/23 2235)  BP: (!) 141/96 (08/11/23 2235)  SpO2: 99 % (08/11/23 2235) Vital Signs (24h Range):  Temp:  [97.4 °F (36.3 °C)-98.3 °F (36.8 °C)] 97.4 °F (36.3  °C)  Pulse:  [60-78] 60  Resp:  [13-23] 17  SpO2:  [98 %-100 %] 99 %  BP: (117-146)/() 141/96     Weight: 86.2 kg (190 lb 0.6 oz)  Body mass index is 25.07 kg/m².     Physical Exam  Vitals and nursing note reviewed.   Constitutional:       General: He is not in acute distress.     Appearance: He is well-developed.   HENT:      Head: Normocephalic.      Right Ear: External ear normal.      Left Ear: External ear normal.   Eyes:      Conjunctiva/sclera: Conjunctivae normal.   Cardiovascular:      Rate and Rhythm: Normal rate and regular rhythm.   Pulmonary:      Effort: Pulmonary effort is normal. No respiratory distress.   Abdominal:      General: There is no distension.      Palpations: Abdomen is soft.   Musculoskeletal:         General: Normal range of motion.   Skin:     General: Skin is warm and dry.   Neurological:      Mental Status: He is alert and oriented to person, place, and time.   Psychiatric:         Thought Content: Thought content normal.                Significant Labs: All pertinent labs within the past 24 hours have been reviewed.    Significant Imaging: I have reviewed all pertinent imaging results/findings within the past 24 hours.    Assessment/Plan:     * Chest pain  EKG without evidence of acute ischemia, troponin negative x2, heart score is 0.  Reported syncope.  Monitor on tele, obtain serial troponin, check echo, NPO after midnight.      VTE Risk Mitigation (From admission, onward)         Ordered     enoxaparin injection 40 mg  Daily         08/11/23 2224     IP VTE HIGH RISK PATIENT  Once         08/11/23 2224     Place sequential compression device  Until discontinued         08/11/23 2224                     On 08/11/2023, patient should be placed in hospital observation services under my care in collaboration with Arsalan Kaufman MD.    Billy Whiting Jr., APRN, AGACNP-BC  Hospitalist - Department of Hospital Medicine  [facility] [address]  [place] [id number]  Office #: [phone]; Pager #: [phone]

## 2023-08-12 NOTE — NURSING
Patient escorted to vehicle via w/c for discharge home. Patient escorted by transport and accompanied by family. No apparent distress noted.

## 2023-08-12 NOTE — DISCHARGE SUMMARY
[facility]  Discharge Summary      Patient Name: Johan Kaufman  MRN: [id number]  ANISA: [id number]  Patient Class: OP- Observation  Admission Date: 8/11/2023  Hospital Length of Stay: 0 days  Discharge Date and Time: 8/12/2023 12:34 PM  Attending Physician: No att. providers found   Discharging Provider: Trudy Pitts DO  Primary Care Provider: [facility] -     Primary Care Team: Networked reference to record PCT     HPI:   38 y.o. male with no previously diagnosed health problems presents with a complaint of chest pain.  Acute onset, duration 2 days, associated with a reported episode of syncope, located left chest, no known exacerbating or alleviating factors.  Denies fever, chills, cough, SOB, palpitations, nausea, vomiting, diarrhea, or abdominal pain.  In the ED, EKG NSR without evidence of acute ischemia, troponin negative x2, routine labs, chest x-ray unremarkable for acute abnormality.  Tox screen positive for THC.  Bilateral lower extremity ultrasounds negative.  CTA head and neck and CT maxillofacial with the only acute abnormality of age-indeterminate left nasal bone fracture.  Placed in observation.      * No surgery found *      Hospital Course:   38 y.o. male with history of tobacco and marijuna use admitted to observation on 08/11/2023 for further evaluation of chest pain and syncope. Presented with complaints of left sided chest pain associated with some shortness of breath. US of LE with no evidence of lower extremity deep venous thrombosis. CXR unremarkable. CTA head and neck with no evidence of high-grade stenosis, large vessel occlusion, or dissection. troponins negative x 3. Echo showed EF 50-55%. Cardiology consulted- troponins are negative, EKG is normal, and echocardiogram does not reveal any wall motion abnormality.Outpatient event monitor will be ordered per cardiology    Admits feeling better overall. Pt denies any fever, headaches,  vision changes, chest pain, shortness of breath, palpitations, abdominal pain, nausea, vomiting, or any new weaknesses. Feels ready to go home. Patient's exam on discharge was as follow: Patient is alert and oriented, appears in no acute distress, heart with regular rate and rhythm, lungs clear to asculation with non-labored breathing, abdomen soft, and no new weaknesses or focal deficits seen. Bilateral lower extremities without any edema or calf tenderness.     Patient was counseled regarding any abnormal labs, differential diagnosis, treatment options, risk-benefit, lifestyle changes, current condition, and medications. Patient was interactive and attentive.  Patient's questions were answered in a respectful and timely manner. Patient was instructed to follow-up with PCP within 1 week and to continue taking medications as prescribed.  Instructed to also follow up with cardiology. Also, extensively discussed the risks, benefits, and side effects of patient's medications. Discussed with patient about any medication changes. Patient verbalized understanding and agrees to treatment plan.  Patient is stable for discharge.  Patient has no other questions or concerns at this time.  ED precautions discussed with the patient.    Vital signs are stable. Ambulating without any difficulty. Tolerating p.o. intake without any nausea or vomiting. Afebrile for over 24 hours. Patient is in stable condition and has no questions or concerns. Patient will be discharge to home .  CM/SW to assist with discharge planning.     Vitals:    08/11/23 2239 08/12/23 0358 08/12/23 0741 08/12/23 1145   BP:  (!) 139/94 128/84 120/84   BP Location:  Right arm  Left arm   Patient Position:  Lying  Lying   Pulse:  68 70 63   Resp:  17 20 18   Temp:  97.6 °F (36.4 °C) 98.1 °F (36.7 °C) 98.4 °F (36.9 °C)   TempSrc:  Oral  Oral   SpO2:  99% 100% 99%   Weight: 86.2 kg (190 lb 0.6 oz)      Height:                  Goals of Care Treatment  Preferences:  Code Status: Full Code      Consults:   Consults (From admission, onward)        Status Ordering Provider     Inpatient consult to Social Work  Once        Provider:  (Not yet assigned)    Completed JEREMÍAS MAYA     Inpatient consult to Cardiology  Once        Provider:  Jeremías Maya MD    Completed MACY FAY.          No new Assessment & Plan notes have been filed under this hospital service since the last note was generated.  Service: Hospital Medicine    Final Active Diagnoses:    Diagnosis Date Noted POA    PRINCIPAL PROBLEM:  Chest pain [R07.9] 08/11/2023 Yes    Syncope and collapse [R55] 08/12/2023 Yes      Problems Resolved During this Admission:       Discharged Condition: stable    Disposition: Home or Self Care    Follow Up:   Follow-up Information     [facility] Emergency Dept. Go to .    Specialty: Emergency Medicine  Why: If symptoms worsen  Contact information:  [address] [facility] [address] [phone]           Jeremías Maya MD. Schedule an appointment as soon as possible for a visit .    Specialties: Cardiology, Interventional Cardiology  Contact information:  [address]  [address]  [facility] [address]  [phone]             [facility] an appointment as soon as possible for a visit in 1 week(s).    Why: Out-Patient Primary Care Hospital Follow-up and to establish care. Also, Out-Patient Cardiology Hospital Follow-up and to establish cardiology care.  Contact information:  [address] [facility] [id number] [phone]                       Patient Instructions:      Ambulatory referral/consult to Cardiology   Standing Status: Future   Referral Priority: Routine Referral Type: Consultation   Referral Reason: Specialty Services Required   Requested Specialty: Cardiology   Number of Visits Requested: 1     Diet Cardiac     Notify your health care provider if you experience any of the following:   severe uncontrolled pain     Notify your health care provider if you experience any of the following:  persistent nausea and vomiting or diarrhea     Notify your health care provider if you experience any of the following:  severe persistent headache     Notify your health care provider if you experience any of the following:  difficulty breathing or increased cough     Activity as tolerated       Significant Diagnostic Studies: Labs: All labs within the past 24 hours have been reviewed       Recent Results (from the past 100 hour(s))   Drug screen panel, emergency    Collection Time: 08/11/23  1:33 PM   Result Value Ref Range    Benzodiazepines Negative Negative    Methadone metabolites Negative Negative    Cocaine (Metab.) Negative Negative    Opiate Scrn, Ur Negative Negative    Barbiturate Screen, Ur Negative Negative    Amphetamine Screen, Ur Negative Negative    THC Presumptive Positive (A) Negative    Phencyclidine Negative Negative    Creatinine, Urine 290.9 23.0 - 375.0 mg/dL    Toxicology Information SEE COMMENT    CBC auto differential    Collection Time: 08/11/23  1:35 PM   Result Value Ref Range    WBC 7.88 3.90 - 12.70 K/uL    RBC 4.98 4.60 - 6.20 M/uL    Hemoglobin 14.5 14.0 - 18.0 g/dL    Hematocrit 44.1 40.0 - 54.0 %    MCV 89 82 - 98 fL    MCH 29.1 27.0 - 31.0 pg    MCHC 32.9 32.0 - 36.0 g/dL    RDW 13.2 11.5 - 14.5 %    Platelets 287 150 - 450 K/uL    MPV 10.5 9.2 - 12.9 fL    Immature Granulocytes 0.1 0.0 - 0.5 %    Gran # (ANC) 3.6 1.8 - 7.7 K/uL    Immature Grans (Abs) 0.01 0.00 - 0.04 K/uL    Lymph # 2.4 1.0 - 4.8 K/uL    Mono # 1.4 (H) 0.3 - 1.0 K/uL    Eos # 0.3 0.0 - 0.5 K/uL    Baso # 0.05 0.00 - 0.20 K/uL    nRBC 0 0 /100 WBC    Gran % 46.2 38.0 - 73.0 %    Lymph % 30.7 18.0 - 48.0 %    Mono % 18.1 (H) 4.0 - 15.0 %    Eosinophil % 4.3 0.0 - 8.0 %    Basophil % 0.6 0.0 - 1.9 %    Differential Method Automated    Comprehensive metabolic panel    Collection Time: 08/11/23  1:35 PM   Result  Value Ref Range    Sodium 142 136 - 145 mmol/L    Potassium 3.9 3.5 - 5.1 mmol/L    Chloride 107 95 - 110 mmol/L    CO2 28 23 - 29 mmol/L    Glucose 81 70 - 110 mg/dL    BUN 18 6 - 20 mg/dL    Creatinine 1.0 0.5 - 1.4 mg/dL    Calcium 9.5 8.7 - 10.5 mg/dL    Total Protein 7.5 6.0 - 8.4 g/dL    Albumin 3.9 3.5 - 5.2 g/dL    Total Bilirubin 0.5 0.1 - 1.0 mg/dL    Alkaline Phosphatase 71 55 - 135 U/L    AST 14 10 - 40 U/L    ALT 10 10 - 44 U/L    eGFR >60 >60 mL/min/1.73 m^2    Anion Gap 7 (L) 8 - 16 mmol/L   Troponin I #1    Collection Time: 08/11/23  1:35 PM   Result Value Ref Range    Troponin I <0.006 0.000 - 0.026 ng/mL   B-Type natriuretic peptide (BNP)    Collection Time: 08/11/23  1:35 PM   Result Value Ref Range    BNP <10 0 - 99 pg/mL   Ethanol    Collection Time: 08/11/23  1:35 PM   Result Value Ref Range    Alcohol, Serum <10 <10 mg/dL   Troponin I    Collection Time: 08/11/23  3:45 PM   Result Value Ref Range    Troponin I <0.006 0.000 - 0.026 ng/mL   Troponin I    Collection Time: 08/12/23  6:10 AM   Result Value Ref Range    Troponin I 0.007 0.000 - 0.026 ng/mL   Lipid panel    Collection Time: 08/12/23  6:10 AM   Result Value Ref Range    Cholesterol 105 (L) 120 - 199 mg/dL    Triglycerides 88 30 - 150 mg/dL    HDL 29 (L) 40 - 75 mg/dL    LDL Cholesterol 58.4 (L) 63.0 - 159.0 mg/dL    HDL/Cholesterol Ratio 27.6 20.0 - 50.0 %    Total Cholesterol/HDL Ratio 3.6 2.0 - 5.0    Non-HDL Cholesterol 76 mg/dL   Echo    Collection Time: 08/12/23  9:59 AM   Result Value Ref Range    BSA 2.39 m2    LVOT stroke volume 93.30 cm3    LVIDd 5.20 3.5 - 6.0 cm    LV Systolic Volume 40.39 mL    LV Systolic Volume Index 19.1 mL/m2    LVIDs 3.18 2.1 - 4.0 cm    LV Diastolic Volume 129.75 mL    LV Diastolic Volume Index 61.49 mL/m2    IVS 1.09 0.6 - 1.1 cm    LVOT diameter 2.45 cm    LVOT area 4.7 cm2    FS 39 28 - 44 %    Left Ventricle Relative Wall Thickness 0.39 cm    Posterior Wall 1.02 0.6 - 1.1 cm    LV mass 208.61 g     LV Mass Index 99 g/m2    MV Peak E Rosendo 0.85 m/s    TDI LATERAL 0.11 m/s    TDI SEPTAL 0.10 m/s    E/E' ratio 8.10 m/s    MV Peak A Rosendo 0.75 m/s    TR Max Rosendo 2.06 m/s    E/A ratio 1.13     IVRT 97.05 msec    E wave deceleration time 188.73 msec    LV SEPTAL E/E' RATIO 8.50 m/s    LV LATERAL E/E' RATIO 7.73 m/s    PV Peak S Rosendo 0.41 m/s    PV Peak D Rosendo 0.39 m/s    Pulm vein S/D ratio 1.05     LVOT peak rosendo 0.99 m/s    Left Ventricular Outflow Tract Mean Velocity 0.66 cm/s    Left Ventricular Outflow Tract Mean Gradient 2.05 mmHg    LA size 2.80 cm    Left Atrium Minor Axis 3.86 cm    TAPSE 2.05 cm    RA Major Axis 4.25 cm    RA Width 2.94 cm    AV mean gradient 4 mmHg    AV peak gradient 7 mmHg    Ao peak rosendo 1.31 m/s    Ao VTI 26.20 cm    LVOT peak VTI 19.80 cm    AV valve area 3.56 cm²    AV Velocity Ratio 0.76     AV index (prosthetic) 0.76     LOUIS by Velocity Ratio 3.56 cm²    MV stenosis pressure 1/2 time 54.73 ms    MV valve area p 1/2 method 4.02 cm2    TV peak gradient 1 mmHg    Triscuspid Valve Regurgitation Peak Gradient 17 mmHg    PV PEAK VELOCITY 0.74 m/s    PV peak gradient 2 mmHg    Sinus 3.62 cm    STJ 3.04 cm    Ascending aorta 3.28 cm    IVC diameter 1.50 cm    Mean e' 0.11 m/s    ZLVIDS -1.89     ZLVIDD -2.40     RVDD 2.97 cm    LA Volume Index 17.9 mL/m2    LA volume 37.76 cm3    Left Atrium Major Axis 4.3 cm    LA WIDTH 3.9 cm    RV base diameter 3.0 cm    RV/LV Ratio 0.58 cm    TV resting pulmonary artery pressure 25 mmHg    RV TB RVSP 10 mmHg    Est. RA pres 8 mmHg       Microbiology Results (last 7 days)     ** No results found for the last 168 hours. **          Imaging Results          US Lower Extremity Veins Bilateral (Final result)  Result time 08/11/23 20:56:25   Procedure changed from US Lower Extremity Veins Left     Final result by Saman Law MD (08/11/23 20:56:25)                 Impression:      No evidence of lower extremity deep venous thrombosis.      Electronically  signed by: Saman Law MD  Date:    08/11/2023  Time:    20:56             Narrative:    EXAMINATION:  US LOWER EXTREMITY VEINS BILATERAL    CLINICAL HISTORY:  CHEST PAIN; Syncope and collapse    TECHNIQUE:  Duplex and color flow Doppler evaluation of the bilateral lower extremity veins was performed.    COMPARISON:  None    FINDINGS:  No evidence of clot involving the bilateral common femoral, greater saphenous, femoral, popliteal, peroneal, anterior and posterior tibial veins.  All venous structures demonstrate normal respiratory phasicity and augment adequately.  No evidence of soft tissue mass or Baker's cyst.                               CTA Head and Neck (xpd) (Final result)  Result time 08/11/23 20:50:55    Final result by Saman Law MD (08/11/23 20:50:55)                 Impression:      1. No acute intracranial abnormalities identified.  2. CTA head and neck with no evidence of high-grade stenosis, large vessel occlusion, or dissection.  3. Age indeterminate left nasal bone fracture.  No additional acute facial fractures identified.      Electronically signed by: Saman Law MD  Date:    08/11/2023  Time:    20:50             Narrative:    EXAMINATION:  CTA HEAD AND NECK (XPD); CT MAXILLOFACIAL WITHOUT CONTRAST    CLINICAL HISTORY:  CNS vasculitis, known or suspected;; Facial trauma;    TECHNIQUE:  Non contrast low dose axial images were obtained through the head.  CT angiogram was performed from the level of the briana to the top of the head following the IV administration of 75mL of Omnipaque 350.   Sagittal and coronal reconstructions and maximum intensity projection reconstructions were performed. Arterial stenosis percentages are based on NASCET measurement criteria.  Separate acquisition maxillofacial CT was also obtained without the use of IV contrast.    COMPARISON:  None    FINDINGS:  No evidence of acute/recent major vascular distribution cerebral infarction, intraparenchymal  hemorrhage, or intra-axial space occupying lesion. The ventricular system is normal in size and configuration with no evidence of hydrocephalus. No effacement of the skull-base cisterns. No abnormal extra-axial fluid collections or blood products.  Minimal bilateral maxillary sinus mucous membrane thickening is seen.  Remaining visualized paranasal sinuses and mastoid air cells are clear.  Orbits show no acute abnormalities.  Age indeterminate left nasal bone fracture is seen.  No additional acute facial fractures identified.  Bilateral broken posterior mandibular molars are seen with prominent periapical lucencies.  The calvarium shows no significant abnormality.    CTA Neck: The origins of the right brachiocephalic, left common carotid and left subclavian arteries from the arch are within normal limits.  The origins of the vertebral arteries are within normal limits.  The vertebral arteries are patent without evidence for focal stenosis or occlusion.   The bilateral common carotid arteries and internal carotid arteries are patent without evidence for focal stenosis or occlusion.  No evidence of carotid or vertebral artery dissection.    Anterior circulation: The bilateral distal cervical, petrous, cavernous, and supraclinoid ICAs are patent without significant stenosis or aneurysm.  Right JOSÉ MIGUEL A1 segment is hypoplastic.  The anterior and middle cerebral arteries are otherwise patent without significant stenosis, occlusion, or aneurysm.    Posterior circulation: Dominant left vertebral artery.  Right vertebral artery is non dominant and appears to terminate as a superior cerebellar branch.  Distal vertebral arteries, basilar artery and posterior cerebral arteries are patent without significant focal stenosis, occlusion, or aneurysm.  Small caliber vertebrobasilar system with bilateral posterior communicating arteries seen which partially supply the bilateral PCAs.    Airways: Unremarkable.    Glands/Nodes: The  parotid, submandibular, and thyroid glands are unremarkable.    Spine: No acute cervical spine abnormalities identified.    Lungs: Visualized lung apices are clear.                               CT Maxillofacial Without Contrast (Final result)  Result time 08/11/23 20:50:55    Final result by Saman Law MD (08/11/23 20:50:55)                 Impression:      1. No acute intracranial abnormalities identified.  2. CTA head and neck with no evidence of high-grade stenosis, large vessel occlusion, or dissection.  3. Age indeterminate left nasal bone fracture.  No additional acute facial fractures identified.      Electronically signed by: Saman Law MD  Date:    08/11/2023  Time:    20:50             Narrative:    EXAMINATION:  CTA HEAD AND NECK (XPD); CT MAXILLOFACIAL WITHOUT CONTRAST    CLINICAL HISTORY:  CNS vasculitis, known or suspected;; Facial trauma;    TECHNIQUE:  Non contrast low dose axial images were obtained through the head.  CT angiogram was performed from the level of the briana to the top of the head following the IV administration of 75mL of Omnipaque 350.   Sagittal and coronal reconstructions and maximum intensity projection reconstructions were performed. Arterial stenosis percentages are based on NASCET measurement criteria.  Separate acquisition maxillofacial CT was also obtained without the use of IV contrast.    COMPARISON:  None    FINDINGS:  No evidence of acute/recent major vascular distribution cerebral infarction, intraparenchymal hemorrhage, or intra-axial space occupying lesion. The ventricular system is normal in size and configuration with no evidence of hydrocephalus. No effacement of the skull-base cisterns. No abnormal extra-axial fluid collections or blood products.  Minimal bilateral maxillary sinus mucous membrane thickening is seen.  Remaining visualized paranasal sinuses and mastoid air cells are clear.  Orbits show no acute abnormalities.  Age indeterminate left  nasal bone fracture is seen.  No additional acute facial fractures identified.  Bilateral broken posterior mandibular molars are seen with prominent periapical lucencies.  The calvarium shows no significant abnormality.    CTA Neck: The origins of the right brachiocephalic, left common carotid and left subclavian arteries from the arch are within normal limits.  The origins of the vertebral arteries are within normal limits.  The vertebral arteries are patent without evidence for focal stenosis or occlusion.   The bilateral common carotid arteries and internal carotid arteries are patent without evidence for focal stenosis or occlusion.  No evidence of carotid or vertebral artery dissection.    Anterior circulation: The bilateral distal cervical, petrous, cavernous, and supraclinoid ICAs are patent without significant stenosis or aneurysm.  Right JOSÉ MIGUEL A1 segment is hypoplastic.  The anterior and middle cerebral arteries are otherwise patent without significant stenosis, occlusion, or aneurysm.    Posterior circulation: Dominant left vertebral artery.  Right vertebral artery is non dominant and appears to terminate as a superior cerebellar branch.  Distal vertebral arteries, basilar artery and posterior cerebral arteries are patent without significant focal stenosis, occlusion, or aneurysm.  Small caliber vertebrobasilar system with bilateral posterior communicating arteries seen which partially supply the bilateral PCAs.    Airways: Unremarkable.    Glands/Nodes: The parotid, submandibular, and thyroid glands are unremarkable.    Spine: No acute cervical spine abnormalities identified.    Lungs: Visualized lung apices are clear.                               X-Ray Chest AP Portable (Final result)  Result time 08/11/23 14:27:29    Final result by Cedric Barillas MD (08/11/23 14:27:29)                 Impression:      See above      Electronically signed by: Cedric Barillas MD  Date:    08/11/2023  Time:    14:27              Narrative:    EXAMINATION:  XR CHEST AP PORTABLE    CLINICAL HISTORY:  Chest Pain;    TECHNIQUE:  Single frontal view of the chest was performed.    COMPARISON:  None    FINDINGS:  Heart size normal.  The lungs are clear.  No pleural effusion                                    Pending Diagnostic Studies:     None         Medications:  Reconciled Home Medications:      Medication List      START taking these medications    HYDROcodone-acetaminophen 5-325 mg per tablet  Commonly known as: NORCO  Take 1 tablet by mouth every 6 (six) hours as needed for Pain.     ibuprofen 600 MG tablet  Commonly known as: ADVIL,MOTRIN  Take 1 tablet (600 mg total) by mouth every 6 (six) hours as needed for Pain.     LIDOcaine 5 %  Commonly known as: LIDODERM  Place 1 patch onto the skin once daily. Remove & Discard patch within 12 hours or as directed by MD            Indwelling Lines/Drains at time of discharge:   Lines/Drains/Airways     None                 Time spent on the discharge of patient: Greater than 35 minutes         Trudy Pitts DO  Department of Hospital Medicine  [facility] - Telemetry

## 2023-08-12 NOTE — PLAN OF CARE
08/12/23 1034   Final Note   Assessment Type Final Discharge Note   Anticipated Discharge Disposition Home  (Follow-up)   What phone number can be called within the next 1-3 days to see how you are doing after discharge?   ([phone])   Hospital Resources/Appts/Education Provided Appointments scheduled by Navigator/Coordinator;Provided patient/caregiver with written discharge plan information;Appointments scheduled and added to AVS;Provided education on problems/symptoms using teachback   Post-Acute Status   Post-Acute Authorization Other  (Home; follow-up)   Coverage Medicaid   Other Status No Post-Acute Service Needs   Discharge Delays None known at this time

## 2023-08-12 NOTE — NURSING
Patient AAOx4, respirations even and unlabored on room air, no acute distress noted. Patient denies chest pain and SOB at this time. Telemetry monitor reads normal sinus rhythm. All safety precautions in place. Will continue to monitor closely.

## 2023-08-12 NOTE — ASSESSMENT & PLAN NOTE
Reproducible/positional.    Troponins negative, EKG normal, echo without wall motion abnormality.    ACS unlikely.    Okay for discharge from a cardiovascular perspective and follow up with me as an outpatient for testing (stress test, event monitor).

## 2023-08-12 NOTE — NURSING
AVS virtually reviewed with patient in its entirety with emphasis on medications, follow-up appointments and reasons to return to the ED or contact the [facility] On Call Nurse Care Line. Patient also encouraged to utilize their patient portal. Ease and convenience of use reiterated. Education complete and patient voiced understanding. All questions answered. Discharge teaching complete.

## 2023-08-12 NOTE — HOSPITAL COURSE
38 y.o. male with history of tobacco and marijuna use admitted to observation on 08/11/2023 for further evaluation of chest pain and syncope. Presented with complaints of left sided chest pain associated with some shortness of breath. US of LE with no evidence of lower extremity deep venous thrombosis. CXR unremarkable. CTA head and neck with no evidence of high-grade stenosis, large vessel occlusion, or dissection. troponins negative x 3. Echo showed EF 50-55%. Cardiology consulted- troponins are negative, EKG is normal, and echocardiogram does not reveal any wall motion abnormality.Outpatient event monitor will be ordered per cardiology    Admits feeling better overall. Pt denies any fever, headaches, vision changes, chest pain, shortness of breath, palpitations, abdominal pain, nausea, vomiting, or any new weaknesses. Feels ready to go home. Patient's exam on discharge was as follow: Patient is alert and oriented, appears in no acute distress, heart with regular rate and rhythm, lungs clear to asculation with non-labored breathing, abdomen soft, and no new weaknesses or focal deficits seen. Bilateral lower extremities without any edema or calf tenderness.     Patient was counseled regarding any abnormal labs, differential diagnosis, treatment options, risk-benefit, lifestyle changes, current condition, and medications. Patient was interactive and attentive.  Patient's questions were answered in a respectful and timely manner. Patient was instructed to follow-up with PCP within 1 week and to continue taking medications as prescribed.  Instructed to also follow up with ***. Also, extensively discussed the risks, benefits, and side effects of patient's medications. Discussed with patient about any medication changes. Patient verbalized understanding and agrees to treatment plan.  Patient is stable for discharge.  Patient has no other questions or concerns at this time.  ED precautions discussed with the  patient.    Vital signs are stable. Ambulating without any difficulty. Tolerating p.o. intake without any nausea or vomiting. Afebrile for over 24 hours. Patient is in stable condition and has no questions or concerns. Patient will be discharge to home .  CM/SW to assist with discharge planning.     Vitals:    08/11/23 2239 08/12/23 0358 08/12/23 0741 08/12/23 1145   BP:  (!) 139/94 128/84 120/84   BP Location:  Right arm  Left arm   Patient Position:  Lying  Lying   Pulse:  68 70 63   Resp:  17 20 18   Temp:  97.6 °F (36.4 °C) 98.1 °F (36.7 °C) 98.4 °F (36.9 °C)   TempSrc:  Oral  Oral   SpO2:  99% 100% 99%   Weight: 86.2 kg (190 lb 0.6 oz)      Height:

## 2023-08-12 NOTE — HPI
38 y.o. male with no previously diagnosed health problems presents with a complaint of chest pain.  Acute onset, duration 2 days, associated with a reported episode of syncope, located left chest, no known exacerbating or alleviating factors.  Denies fever, chills, cough, SOB, palpitations, nausea, vomiting, diarrhea, or abdominal pain.  In the ED, EKG NSR without evidence of acute ischemia, troponin negative x2, routine labs, chest x-ray unremarkable for acute abnormality.  Tox screen positive for THC.  Bilateral lower extremity ultrasounds negative.  CTA head and neck and CT maxillofacial with the only acute abnormality of age-indeterminate left nasal bone fracture.  Placed in observation.

## 2023-08-12 NOTE — ASSESSMENT & PLAN NOTE
EKG without evidence of acute ischemia, troponin negative x2, heart score is 0.  Reported syncope.  Monitor on tele, obtain serial troponin, check echo, NPO after midnight.

## 2023-08-12 NOTE — HPI
38 y.o. male with no previously diagnosed health problems presents with a complaint of chest pain.  Acute onset, duration 2 days, associated with a reported episode of syncope, located left chest, no known exacerbating or alleviating factors.  Denies fever, chills, cough, SOB, palpitations, nausea, vomiting, diarrhea, or abdominal pain.  In the ED, EKG NSR without evidence of acute ischemia, troponin negative x2, routine labs, chest x-ray unremarkable for acute abnormality.  Tox screen positive for THC.  Bilateral lower extremity ultrasounds negative.  CTA head and neck and CT maxillofacial with the only acute abnormality of age-indeterminate left nasal bone fracture.  Placed in observation.    Cardiology consulted for CP/LOC.    The patient is a very pleasant 38-year-old man presenting with chest pain.  He describes an episode of syncope with facial trauma 2 days prior to admission.  Yesterday, he developed sudden onset of left-sided chest discomfort which he described as tightness.  However, this would occur when he rolls on his left side, and is reproducible on palpation of his chest wall.  His troponins are negative, EKG is normal, and echocardiogram does not reveal any wall motion abnormality.  I do not feel that this represents ACS.  I think outpatient testing is reasonable.  The patient is agreeable.

## 2023-08-12 NOTE — NURSING
[facility] - [place]  Nurses Note -- 4 Eyes      8/12/2023       Skin assessed on: Q Shift      [x] No Pressure Injuries Present    []Prevention Measures Documented    [] Yes LDA  for Pressure Injury Previously documented     [] Yes New Pressure Injury Discovered   [] LDA for New Pressure Injury Added      Attending RN:  Ross Martinez, RN     Second RN:  Elvie Doshi RN

## 2023-08-26 ENCOUNTER — HOSPITAL ENCOUNTER (EMERGENCY)
Facility: HOSPITAL | Age: 38
Discharge: HOME | End: 2023-08-27
Attending: EMERGENCY MEDICINE
Payer: MEDICAID

## 2023-08-26 DIAGNOSIS — R06.02 SOB (SHORTNESS OF BREATH): ICD-10-CM

## 2023-08-26 DIAGNOSIS — R07.9 CHEST PAIN, UNSPECIFIED TYPE: ICD-10-CM

## 2023-08-26 DIAGNOSIS — R91.8 MULTIPLE PULMONARY NODULES: ICD-10-CM

## 2023-08-26 DIAGNOSIS — R55 SYNCOPE, UNSPECIFIED SYNCOPE TYPE: Primary | ICD-10-CM

## 2023-08-26 DIAGNOSIS — R07.9 CHEST PAIN: ICD-10-CM

## 2023-08-26 DIAGNOSIS — R07.89 CHEST WALL PAIN: ICD-10-CM

## 2023-08-26 LAB
ALBUMIN SERPL BCP-MCNC: 3.7 G/DL (ref 3.5–5.2)
ALP SERPL-CCNC: 79 U/L (ref 55–135)
ALT SERPL W/O P-5'-P-CCNC: 16 U/L (ref 10–44)
ANION GAP SERPL CALC-SCNC: 7 MMOL/L (ref 8–16)
AST SERPL-CCNC: 17 U/L (ref 10–40)
BASOPHILS # BLD AUTO: 0.05 K/UL (ref 0–0.2)
BASOPHILS NFR BLD: 0.4 % (ref 0–1.9)
BILIRUB SERPL-MCNC: 0.4 MG/DL (ref 0.1–1)
BNP SERPL-MCNC: 51 PG/ML (ref 0–99)
BUN SERPL-MCNC: 16 MG/DL (ref 6–20)
CALCIUM SERPL-MCNC: 9.1 MG/DL (ref 8.7–10.5)
CHLORIDE SERPL-SCNC: 101 MMOL/L (ref 95–110)
CO2 SERPL-SCNC: 27 MMOL/L (ref 23–29)
CREAT SERPL-MCNC: 1 MG/DL (ref 0.5–1.4)
D DIMER PPP IA.FEU-MCNC: 0.71 MG/L FEU
DIFFERENTIAL METHOD: ABNORMAL
EOSINOPHIL # BLD AUTO: 0.5 K/UL (ref 0–0.5)
EOSINOPHIL NFR BLD: 4.2 % (ref 0–8)
ERYTHROCYTE [DISTWIDTH] IN BLOOD BY AUTOMATED COUNT: 12.7 % (ref 11.5–14.5)
EST. GFR  (NO RACE VARIABLE): >60 ML/MIN/1.73 M^2
GLUCOSE SERPL-MCNC: 92 MG/DL (ref 70–110)
HCT VFR BLD AUTO: 41.9 % (ref 40–54)
HGB BLD-MCNC: 14.4 G/DL (ref 14–18)
IMM GRANULOCYTES # BLD AUTO: 0.05 K/UL (ref 0–0.04)
IMM GRANULOCYTES NFR BLD AUTO: 0.4 % (ref 0–0.5)
LYMPHOCYTES # BLD AUTO: 1.7 K/UL (ref 1–4.8)
LYMPHOCYTES NFR BLD: 14.3 % (ref 18–48)
MCH RBC QN AUTO: 29.5 PG (ref 27–31)
MCHC RBC AUTO-ENTMCNC: 34.4 G/DL (ref 32–36)
MCV RBC AUTO: 86 FL (ref 82–98)
MONOCYTES # BLD AUTO: 1.3 K/UL (ref 0.3–1)
MONOCYTES NFR BLD: 10.9 % (ref 4–15)
NEUTROPHILS # BLD AUTO: 8.4 K/UL (ref 1.8–7.7)
NEUTROPHILS NFR BLD: 69.8 % (ref 38–73)
NRBC BLD-RTO: 0 /100 WBC
PLATELET # BLD AUTO: 285 K/UL (ref 150–450)
PMV BLD AUTO: 10.5 FL (ref 9.2–12.9)
POTASSIUM SERPL-SCNC: 4 MMOL/L (ref 3.5–5.1)
PROT SERPL-MCNC: 7.1 G/DL (ref 6–8.4)
RBC # BLD AUTO: 4.88 M/UL (ref 4.6–6.2)
SODIUM SERPL-SCNC: 135 MMOL/L (ref 136–145)
WBC # BLD AUTO: 11.98 K/UL (ref 3.9–12.7)

## 2023-08-26 PROCEDURE — 93010 ELECTROCARDIOGRAM REPORT: CPT | Mod: ,,, | Performed by: INTERNAL MEDICINE

## 2023-08-26 PROCEDURE — 93010 EKG 12-LEAD: ICD-10-PCS | Mod: ,,, | Performed by: INTERNAL MEDICINE

## 2023-08-26 PROCEDURE — 80053 COMPREHEN METABOLIC PANEL: CPT | Performed by: EMERGENCY MEDICINE

## 2023-08-26 PROCEDURE — 93005 ELECTROCARDIOGRAM TRACING: CPT

## 2023-08-26 PROCEDURE — 85379 FIBRIN DEGRADATION QUANT: CPT | Performed by: EMERGENCY MEDICINE

## 2023-08-26 PROCEDURE — 85025 COMPLETE CBC W/AUTO DIFF WBC: CPT | Performed by: EMERGENCY MEDICINE

## 2023-08-26 PROCEDURE — 99285 EMERGENCY DEPT VISIT HI MDM: CPT | Mod: 25

## 2023-08-26 PROCEDURE — 83880 ASSAY OF NATRIURETIC PEPTIDE: CPT | Performed by: EMERGENCY MEDICINE

## 2023-08-26 PROCEDURE — 84484 ASSAY OF TROPONIN QUANT: CPT | Performed by: EMERGENCY MEDICINE

## 2023-08-26 RX ORDER — BUTALBITAL, ACETAMINOPHEN AND CAFFEINE 50; 325; 40 MG/1; MG/1; MG/1
1 TABLET ORAL
Status: COMPLETED | OUTPATIENT
Start: 2023-08-26 | End: 2023-08-27

## 2023-08-26 RX ORDER — ACETAMINOPHEN 325 MG/1
650 TABLET ORAL
Status: COMPLETED | OUTPATIENT
Start: 2023-08-26 | End: 2023-08-27

## 2023-08-27 VITALS
RESPIRATION RATE: 19 BRPM | TEMPERATURE: 97 F | OXYGEN SATURATION: 100 % | HEIGHT: 73 IN | SYSTOLIC BLOOD PRESSURE: 138 MMHG | DIASTOLIC BLOOD PRESSURE: 97 MMHG | HEART RATE: 68 BPM | WEIGHT: 215 LBS | BODY MASS INDEX: 28.49 KG/M2

## 2023-08-27 DIAGNOSIS — R51.9 ACUTE INTRACTABLE HEADACHE, UNSPECIFIED HEADACHE TYPE: Primary | ICD-10-CM

## 2023-08-27 LAB
AMPHET+METHAMPHET UR QL: NEGATIVE
BARBITURATES UR QL SCN>200 NG/ML: ABNORMAL
BENZODIAZ UR QL SCN>200 NG/ML: NEGATIVE
BILIRUB UR QL STRIP: NEGATIVE
BZE UR QL SCN: NEGATIVE
CANNABINOIDS UR QL SCN: ABNORMAL
CLARITY UR: CLEAR
COLOR UR: COLORLESS
CREAT UR-MCNC: 52.5 MG/DL (ref 23–375)
GLUCOSE UR QL STRIP: NEGATIVE
HGB UR QL STRIP: NEGATIVE
KETONES UR QL STRIP: NEGATIVE
LEUKOCYTE ESTERASE UR QL STRIP: NEGATIVE
METHADONE UR QL SCN>300 NG/ML: NEGATIVE
NITRITE UR QL STRIP: NEGATIVE
OPIATES UR QL SCN: NEGATIVE
PCP UR QL SCN>25 NG/ML: NEGATIVE
PH UR STRIP: 6 [PH] (ref 5–8)
PROT UR QL STRIP: NEGATIVE
SP GR UR STRIP: >1.03 (ref 1–1.03)
TOXICOLOGY INFORMATION: ABNORMAL
TROPONIN I SERPL DL<=0.01 NG/ML-MCNC: <0.006 NG/ML (ref 0–0.03)
TROPONIN I SERPL DL<=0.01 NG/ML-MCNC: <0.006 NG/ML (ref 0–0.03)
URN SPEC COLLECT METH UR: ABNORMAL
UROBILINOGEN UR STRIP-ACNC: NEGATIVE EU/DL

## 2023-08-27 PROCEDURE — 96360 HYDRATION IV INFUSION INIT: CPT | Mod: 59

## 2023-08-27 PROCEDURE — 80307 DRUG TEST PRSMV CHEM ANLYZR: CPT | Performed by: EMERGENCY MEDICINE

## 2023-08-27 PROCEDURE — 25500020 PHARM REV CODE 255: Performed by: EMERGENCY MEDICINE

## 2023-08-27 PROCEDURE — 25000003 PHARM REV CODE 250: Performed by: EMERGENCY MEDICINE

## 2023-08-27 PROCEDURE — 84484 ASSAY OF TROPONIN QUANT: CPT | Performed by: EMERGENCY MEDICINE

## 2023-08-27 PROCEDURE — 81003 URINALYSIS AUTO W/O SCOPE: CPT | Mod: 59 | Performed by: EMERGENCY MEDICINE

## 2023-08-27 RX ORDER — HYDROCODONE BITARTRATE AND ACETAMINOPHEN 5; 325 MG/1; MG/1
1 TABLET ORAL EVERY 6 HOURS PRN
Qty: 12 TABLET | Refills: 0 | Status: SHIPPED | OUTPATIENT
Start: 2023-08-27

## 2023-08-27 RX ORDER — IBUPROFEN 600 MG/1
600 TABLET ORAL EVERY 6 HOURS PRN
Qty: 20 TABLET | Refills: 0 | Status: SHIPPED | OUTPATIENT
Start: 2023-08-27

## 2023-08-27 RX ORDER — LIDOCAINE 50 MG/G
1 PATCH TOPICAL DAILY
Qty: 15 PATCH | Refills: 0 | Status: SHIPPED | OUTPATIENT
Start: 2023-08-27 | End: 2023-09-11

## 2023-08-27 RX ADMIN — BUTALBITAL, ACETAMINOPHEN, AND CAFFEINE 1 TABLET: 325; 50; 40 TABLET ORAL at 12:08

## 2023-08-27 RX ADMIN — SODIUM CHLORIDE 1000 ML: 9 INJECTION, SOLUTION INTRAVENOUS at 12:08

## 2023-08-27 RX ADMIN — ACETAMINOPHEN 650 MG: 325 TABLET ORAL at 12:08

## 2023-08-27 RX ADMIN — IOHEXOL 80 ML: 350 INJECTION, SOLUTION INTRAVENOUS at 12:08

## 2023-08-27 NOTE — ED PROVIDER NOTES
"Encounter Date: 8/26/2023    SCRIBE #1 NOTE: I, Reed Mcgovern, am scribing for, and in the presence of,  Sepideh Mcdonough MD. I have scribed the following portions of the note - Other sections scribed: HPI, ROS, PE.       History     Chief Complaint   Patient presents with    Shortness of Breath     Patient reports SOB and sharp constant Chest pain with deep breath that started today while at work.      37 y/o male with no known PMHx, who presents to the ED for evaluation of syncopal episode today. Patient reports he was at work "coming down from a machine" when he suddenly felt lightheaded and had a syncopal episode, waking up to people surrounding him. He further endorses an associated headache "while he went down."  He reports his headache is a current 8/10.  He now reports mid-sternal chest pain, which he states "have been ongoing for a while but he has been ignoring it because he thought it would subside on its own." He states his chest pain is exacerbated with deep inhalation. He endorses a similar episode last month here he was told he was dehydrated. Denies neck pain, back pain, myalgias, diaphoresis, N/V, bowel or bladder dysfunction, or other associated symptoms. Denies EtOH, or other recreational drug usage. Patient reports he is a smoker, with his last use taking place 3 days ago.     The history is provided by the patient. No  was used.     Review of patient's allergies indicates:  No Known Allergies  Past Medical History:   Diagnosis Date    ADHD      History reviewed. No pertinent surgical history.  Family History   Problem Relation Age of Onset    COPD Neg Hx     Drug abuse Neg Hx     Hyperlipidemia Neg Hx     Mental retardation Neg Hx      Social History     Tobacco Use    Smoking status: Every Day     Current packs/day: 0.25     Types: Cigarettes    Smokeless tobacco: Never    Tobacco comments:     two cigaretts a day   Substance Use Topics    Alcohol use: Yes     Comment: rarely "    Drug use: Yes     Types: Marijuana     Comment: rarely     Review of Systems   Constitutional:  Negative for diaphoresis.   Cardiovascular:  Positive for chest pain.   Gastrointestinal:  Negative for nausea and vomiting.   Musculoskeletal:  Negative for back pain, myalgias and neck pain.   Neurological:  Positive for syncope, light-headedness and headaches.   All other systems reviewed and are negative.      Physical Exam     Initial Vitals [08/26/23 1800]   BP Pulse Resp Temp SpO2   (!) 157/92 77 18 98 °F (36.7 °C) 100 %      MAP       --         Physical Exam    Nursing note and vitals reviewed.  Constitutional: He appears well-developed and well-nourished.   HENT:   Head: Normocephalic and atraumatic.   Right Ear: External ear normal.   Left Ear: External ear normal.   Mouth/Throat: Mucous membranes are dry.   Eyes: Conjunctivae are normal.   Neck: Phonation normal. Neck supple.   Normal range of motion.  Cardiovascular:  Normal rate and intact distal pulses.           Pulmonary/Chest: Effort normal. No stridor. No respiratory distress.   Abdominal: There is no abdominal tenderness.   Musculoskeletal:         General: Normal range of motion.      Cervical back: Normal range of motion and neck supple.      Right lower leg: No edema.      Left lower leg: No edema.     Neurological: He is alert and oriented to person, place, and time.   Skin: Skin is warm and dry. No rash noted.   Psychiatric: His behavior is normal. His mood appears anxious.         ED Course   Procedures  Labs Reviewed   CBC W/ AUTO DIFFERENTIAL - Abnormal; Notable for the following components:       Result Value    Gran # (ANC) 8.4 (*)     Immature Grans (Abs) 0.05 (*)     Mono # 1.3 (*)     Lymph % 14.3 (*)     All other components within normal limits   COMPREHENSIVE METABOLIC PANEL - Abnormal; Notable for the following components:    Sodium 135 (*)     Anion Gap 7 (*)     All other components within normal limits   D DIMER, QUANTITATIVE -  Abnormal; Notable for the following components:    D-Dimer 0.71 (*)     All other components within normal limits   URINALYSIS, REFLEX TO URINE CULTURE - Abnormal; Notable for the following components:    Color, UA Colorless (*)     Specific Gravity, UA >1.030 (*)     All other components within normal limits    Narrative:     Specimen Source->Urine   DRUG SCREEN PANEL, URINE EMERGENCY - Abnormal; Notable for the following components:    Barbiturate Screen, Ur Presumptive Positive (*)     THC Presumptive Positive (*)     All other components within normal limits    Narrative:     Specimen Source->Urine   TROPONIN I   B-TYPE NATRIURETIC PEPTIDE   TROPONIN I     EKG Readings: (Independently Interpreted)   Initial Reading: No STEMI. Rhythm: Normal Sinus Rhythm. Heart Rate: 86. Ectopy: No Ectopy. Conduction: Normal. ST Segments: Normal ST Segments. T Waves: Normal. Axis: Normal. Clinical Impression: Normal Sinus Rhythm     ECG Results              EKG 12-lead (Final result)  Result time 08/28/23 20:12:31      Final result by Interface, Lab In [facility] (08/28/23 20:12:31)                   Narrative:    Test Reason : R06.02,    Vent. Rate : 086 BPM     Atrial Rate : 086 BPM     P-R Int : 162 ms          QRS Dur : 080 ms      QT Int : 352 ms       P-R-T Axes : 083 047 075 degrees     QTc Int : 421 ms    Normal sinus rhythm  Normal ECG  When compared with ECG of 11-AUG-2023 13:21,  No significant change was found  Confirmed by Jeremías Springer MD ([id number]) on 8/28/2023 8:12:21 PM    Referred By: [email or web address]   SELF           Confirmed By:Jeremías Springer MD                                  Imaging Results               CTA Chest Non-Coronary (PE Studies) (Final result)  Result time 08/27/23 01:04:27      Final result by Chacho Galindo MD (08/27/23 01:04:27)                   Impression:      There is no large central saddle type pulmonary embolus.  When accounting for artifact and limitations of the exam, there is no  additional evidence for pulmonary embolus on this exam.    Pulmonary nodules measuring up to 6.2 mm.  For multiple solid nodules with any 6 mm or greater, Fleischner Society guidelines recommend follow up with non-contrast chest CT at 3-6 months and 18-24 months after discovery.    This report was flagged in Epic as abnormal.      Electronically signed by: Chacho Galindo  Date:    08/27/2023  Time:    01:04               Narrative:    EXAMINATION:  CTA CHEST NON CORONARY (PE STUDIES)    CLINICAL HISTORY:  Pulmonary embolism (PE) suspected, positive D-dimer;    TECHNIQUE:  Low dose axial images, sagittal and coronal reformations were obtained from the thoracic inlet to the lung bases following the IV administration of 80 mL of Omnipaque 350.  Contrast timing was optimized to evaluate the pulmonary arteries.  MIP images were performed.    COMPARISON:  Chest radiograph August 26, 2023    FINDINGS:  There is no large central saddle type pulmonary embolus.  When accounting for artifact the pulmonary arterial vascular demonstrate appropriate opacification without evidence for abnormal pattern of pulmonary arterial filling defects specific for pulmonary emboli.    The thoracic aorta demonstrates appropriate opacification.  There is no enlarged mediastinal or hilar adenopathy.  There is no pericardial effusion.    There is a pulmonary nodule of the left upper lobe series 3 axial image 209 measuring approximately 6.2 mm, there appears to be a smaller adjacent pulmonary nodule.  The lungs demonstrate mild atelectatic change.  There is no evidence for dense consolidation.  There is no evidence for pleural effusion.  There is no evidence for pneumothorax.    Limited imaging of the upper abdomen demonstrates no evidence for acute upper abdominal process.  The visualized osseous structures appear intact.                                       CT Head Without Contrast (Final result)  Result time 08/26/23 23:51:45      Final result  by Wesly Castro MD (08/26/23 23:51:45)                   Impression:      No acute abnormality.      Electronically signed by: Wesly Castro  Date:    08/26/2023  Time:    23:51               Narrative:    EXAMINATION:  CT HEAD WITHOUT CONTRAST    CLINICAL HISTORY:  Syncope, recurrent;    TECHNIQUE:  Low dose axial CT images obtained throughout the head without intravenous contrast. Sagittal and coronal reconstructions were performed.    COMPARISON:  08/11/2023    FINDINGS:  Intracranial compartment:    Ventricles and sulci are normal in size for age without evidence of hydrocephalus. No extra-axial blood or fluid collections.    The brain parenchyma appears normal. No parenchymal mass, hemorrhage, edema or major vascular distribution infarct.    Skull/extracranial contents (limited evaluation): No fracture. Mastoid air cells and paranasal sinuses are essentially clear.                                       X-Ray Chest AP Portable (Final result)  Result time 08/26/23 23:33:40      Final result by Saman Law MD (08/26/23 23:33:40)                   Impression:      No acute cardiopulmonary process identified.      Electronically signed by: Saman Law MD  Date:    08/26/2023  Time:    23:33               Narrative:    EXAMINATION:  XR CHEST AP PORTABLE    CLINICAL HISTORY:  Other chest pain    TECHNIQUE:  Single frontal view of the chest was performed.    COMPARISON:  08/11/2023.    FINDINGS:  Cardiac silhouette is normal in size.  Lungs are symmetrically expanded.  No evidence of focal consolidative process, pneumothorax, or significant pleural effusion.  No acute osseous abnormality identified.                                       Medications   sodium chloride 0.9% bolus 1,000 mL 1,000 mL (0 mLs Intravenous Stopped 8/27/23 0105)   butalbital-acetaminophen-caffeine -40 mg per tablet 1 tablet (1 tablet Oral Given 8/27/23 0008)   acetaminophen tablet 650 mg (650 mg Oral Given 8/27/23 0008)    iohexoL (OMNIPAQUE 350) injection 80 mL (80 mLs Intravenous Given 8/27/23 0044)     Medical Decision Making  Amount and/or Complexity of Data Reviewed  Labs: ordered.  Radiology: ordered.  ECG/medicine tests: ordered and independent interpretation performed.    Risk  OTC drugs.  Prescription drug management.      Labs Reviewed        Admission on 08/26/2023, Discharged on 08/27/2023   Component Date Value Ref Range Status    WBC 08/26/2023 11.98  3.90 - 12.70 K/uL Final    RBC 08/26/2023 4.88  4.60 - 6.20 M/uL Final    Hemoglobin 08/26/2023 14.4  14.0 - 18.0 g/dL Final    Hematocrit 08/26/2023 41.9  40.0 - 54.0 % Final    MCV 08/26/2023 86  82 - 98 fL Final    MCH 08/26/2023 29.5  27.0 - 31.0 pg Final    MCHC 08/26/2023 34.4  32.0 - 36.0 g/dL Final    RDW 08/26/2023 12.7  11.5 - 14.5 % Final    Platelets 08/26/2023 285  150 - 450 K/uL Final    MPV 08/26/2023 10.5  9.2 - 12.9 fL Final    Immature Granulocytes 08/26/2023 0.4  0.0 - 0.5 % Final    Gran # (ANC) 08/26/2023 8.4 (H)  1.8 - 7.7 K/uL Final    Immature Grans (Abs) 08/26/2023 0.05 (H)  0.00 - 0.04 K/uL Final    Comment: Mild elevation in immature granulocytes is non specific and   can be seen in a variety of conditions including stress response,   acute inflammation, trauma and pregnancy. Correlation with other   laboratory and clinical findings is essential.      Lymph # 08/26/2023 1.7  1.0 - 4.8 K/uL Final    Mono # 08/26/2023 1.3 (H)  0.3 - 1.0 K/uL Final    Eos # 08/26/2023 0.5  0.0 - 0.5 K/uL Final    Baso # 08/26/2023 0.05  0.00 - 0.20 K/uL Final    nRBC 08/26/2023 0  0 /100 WBC Final    Gran % 08/26/2023 69.8  38.0 - 73.0 % Final    Lymph % 08/26/2023 14.3 (L)  18.0 - 48.0 % Final    Mono % 08/26/2023 10.9  4.0 - 15.0 % Final    Eosinophil % 08/26/2023 4.2  0.0 - 8.0 % Final    Basophil % 08/26/2023 0.4  0.0 - 1.9 % Final    Differential Method 08/26/2023 Automated   Final    Sodium 08/26/2023 135 (L)  136 - 145 mmol/L Final    Potassium 08/26/2023  4.0  3.5 - 5.1 mmol/L Final    Chloride 08/26/2023 101  95 - 110 mmol/L Final    CO2 08/26/2023 27  23 - 29 mmol/L Final    Glucose 08/26/2023 92  70 - 110 mg/dL Final    BUN 08/26/2023 16  6 - 20 mg/dL Final    Creatinine 08/26/2023 1.0  0.5 - 1.4 mg/dL Final    Calcium 08/26/2023 9.1  8.7 - 10.5 mg/dL Final    Total Protein 08/26/2023 7.1  6.0 - 8.4 g/dL Final    Albumin 08/26/2023 3.7  3.5 - 5.2 g/dL Final    Total Bilirubin 08/26/2023 0.4  0.1 - 1.0 mg/dL Final    Comment: For infants and newborns, interpretation of results should be based  on gestational age, weight and in agreement with clinical  observations.    Premature Infant recommended reference ranges:  Up to 24 hours.............<8.0 mg/dL  Up to 48 hours............<12.0 mg/dL  3-5 days..................<15.0 mg/dL  6-29 days.................<15.0 mg/dL      Alkaline Phosphatase 08/26/2023 79  55 - 135 U/L Final    AST 08/26/2023 17  10 - 40 U/L Final    ALT 08/26/2023 16  10 - 44 U/L Final    eGFR 08/26/2023 >60  >60 mL/min/1.73 m^2 Final    Anion Gap 08/26/2023 7 (L)  8 - 16 mmol/L Final    Troponin I 08/26/2023 <0.006  0.000 - 0.026 ng/mL Final    Comment: The reference interval for Troponin I represents the 99th percentile   cutoff   for our facility and is consistent with 3rd generation assay   performance.      BNP 08/26/2023 51  0 - 99 pg/mL Final    Values of less than 100 pg/ml are consistent with non-CHF populations.    D-Dimer 08/26/2023 0.71 (H)  <0.50 mg/L FEU Final    Comment: The quantitative D-dimer assay should be used as an aid in   the diagnosis of deep vein thrombosis and pulmonary embolism  in patients with the appropriate presentation and clinical  history. The upper limit of the reference interval and the clinical   cut off   point are identical. Causes of a positive (>0.50 mg/L FEU) D-Dimer   test  include, but are not limited to: DVT, PE, DIC, thrombolytic   therapy, anticoagulant therapy, recent surgery, trauma, or    pregnancy, disseminated malignancy, aortic aneurysm, cirrhosis,  and severe infection. False negative results may occur in   patients with distal DVT.      Specimen UA 08/27/2023 Urine, Clean Catch   Final    Color, UA 08/27/2023 Colorless (A)  Yellow, Straw, Yuliet Final    Appearance, UA 08/27/2023 Clear  Clear Final    pH, UA 08/27/2023 6.0  5.0 - 8.0 Final    Specific Gravity, UA 08/27/2023 >1.030 (A)  1.005 - 1.030 Final    Protein, UA 08/27/2023 Negative  Negative Final    Comment: Recommend a 24 hour urine protein or a urine   protein/creatinine ratio if globulin induced proteinuria is  clinically suspected.      Glucose, UA 08/27/2023 Negative  Negative Final    Ketones, UA 08/27/2023 Negative  Negative Final    Bilirubin (UA) 08/27/2023 Negative  Negative Final    Occult Blood UA 08/27/2023 Negative  Negative Final    Nitrite, UA 08/27/2023 Negative  Negative Final    Urobilinogen, UA 08/27/2023 Negative  <2.0 EU/dL Final    Leukocytes, UA 08/27/2023 Negative  Negative Final    Benzodiazepines 08/27/2023 Negative  Negative Final    Methadone metabolites 08/27/2023 Negative  Negative Final    Cocaine (Metab.) 08/27/2023 Negative  Negative Final    Opiate Scrn, Ur 08/27/2023 Negative  Negative Final    Barbiturate Screen, Ur 08/27/2023 Presumptive Positive (A)  Negative Final    Amphetamine Screen, Ur 08/27/2023 Negative  Negative Final    THC 08/27/2023 Presumptive Positive (A)  Negative Final    Phencyclidine 08/27/2023 Negative  Negative Final    Creatinine, Urine 08/27/2023 52.5  23.0 - 375.0 mg/dL Final    Toxicology Information 08/27/2023 SEE COMMENT   Final    Comment: This screen includes the following classes of drugs at the listed   cut-off:    Benzodiazepines 200 ng/ml  Methadone 300 ng/ml  Cocaine metabolite 300 ng/ml  Opiates 300 ng/ml  Barbiturates 200 ng/ml  Amphetamines 1000 ng/ml  Marijuana metabs (THC) 50 ng/ml  Phencyclidine (PCP) 25 ng/ml    This is a screening test. If results do not  correlate with clinical   presentation, then a confirmatory send out test is advised.     This report is intended for use in clinical monitoring and management   of   patients. It is not intended for use in employment related drug   testing.      Troponin I 08/27/2023 <0.006  0.000 - 0.026 ng/mL Final    Comment: The reference interval for Troponin I represents the 99th percentile   cutoff   for our facility and is consistent with 3rd generation assay   performance.          Imaging Reviewed    Imaging Results               CTA Chest Non-Coronary (PE Studies) (Final result)  Result time 08/27/23 01:04:27      Final result by Chacho Galindo MD (08/27/23 01:04:27)                   Impression:      There is no large central saddle type pulmonary embolus.  When accounting for artifact and limitations of the exam, there is no additional evidence for pulmonary embolus on this exam.    Pulmonary nodules measuring up to 6.2 mm.  For multiple solid nodules with any 6 mm or greater, Fleischner Society guidelines recommend follow up with non-contrast chest CT at 3-6 months and 18-24 months after discovery.    This report was flagged in Epic as abnormal.      Electronically signed by: Chacho Galindo  Date:    08/27/2023  Time:    01:04               Narrative:    EXAMINATION:  CTA CHEST NON CORONARY (PE STUDIES)    CLINICAL HISTORY:  Pulmonary embolism (PE) suspected, positive D-dimer;    TECHNIQUE:  Low dose axial images, sagittal and coronal reformations were obtained from the thoracic inlet to the lung bases following the IV administration of 80 mL of Omnipaque 350.  Contrast timing was optimized to evaluate the pulmonary arteries.  MIP images were performed.    COMPARISON:  Chest radiograph August 26, 2023    FINDINGS:  There is no large central saddle type pulmonary embolus.  When accounting for artifact the pulmonary arterial vascular demonstrate appropriate opacification without evidence for abnormal pattern of  pulmonary arterial filling defects specific for pulmonary emboli.    The thoracic aorta demonstrates appropriate opacification.  There is no enlarged mediastinal or hilar adenopathy.  There is no pericardial effusion.    There is a pulmonary nodule of the left upper lobe series 3 axial image 209 measuring approximately 6.2 mm, there appears to be a smaller adjacent pulmonary nodule.  The lungs demonstrate mild atelectatic change.  There is no evidence for dense consolidation.  There is no evidence for pleural effusion.  There is no evidence for pneumothorax.    Limited imaging of the upper abdomen demonstrates no evidence for acute upper abdominal process.  The visualized osseous structures appear intact.                                       CT Head Without Contrast (Final result)  Result time 08/26/23 23:51:45      Final result by Wesly Castro MD (08/26/23 23:51:45)                   Impression:      No acute abnormality.      Electronically signed by: Wesly Castro  Date:    08/26/2023  Time:    23:51               Narrative:    EXAMINATION:  CT HEAD WITHOUT CONTRAST    CLINICAL HISTORY:  Syncope, recurrent;    TECHNIQUE:  Low dose axial CT images obtained throughout the head without intravenous contrast. Sagittal and coronal reconstructions were performed.    COMPARISON:  08/11/2023    FINDINGS:  Intracranial compartment:    Ventricles and sulci are normal in size for age without evidence of hydrocephalus. No extra-axial blood or fluid collections.    The brain parenchyma appears normal. No parenchymal mass, hemorrhage, edema or major vascular distribution infarct.    Skull/extracranial contents (limited evaluation): No fracture. Mastoid air cells and paranasal sinuses are essentially clear.                                       X-Ray Chest AP Portable (Final result)  Result time 08/26/23 23:33:40      Final result by Saman Law MD (08/26/23 23:33:40)                   Impression:      No acute  cardiopulmonary process identified.      Electronically signed by: Saman Law MD  Date:    08/26/2023  Time:    23:33               Narrative:    EXAMINATION:  XR CHEST AP PORTABLE    CLINICAL HISTORY:  Other chest pain    TECHNIQUE:  Single frontal view of the chest was performed.    COMPARISON:  08/11/2023.    FINDINGS:  Cardiac silhouette is normal in size.  Lungs are symmetrically expanded.  No evidence of focal consolidative process, pneumothorax, or significant pleural effusion.  No acute osseous abnormality identified.                                      Medications given in ED    Medications   sodium chloride 0.9% bolus 1,000 mL 1,000 mL (0 mLs Intravenous Stopped 8/27/23 0105)   butalbital-acetaminophen-caffeine -40 mg per tablet 1 tablet (1 tablet Oral Given 8/27/23 0008)   acetaminophen tablet 650 mg (650 mg Oral Given 8/27/23 0008)   iohexoL (OMNIPAQUE 350) injection 80 mL (80 mLs Intravenous Given 8/27/23 0044)         Note was created using voice recognition software. Note may have occasional typographical errors that may not have been identified and edited despite good julianna initial review prior to signing.    I, Sepideh Mcdonough MD, personally performed the services described in this documentation. All medical record entries made by the scribe were at my direction and in my presence.  I have reviewed the chart and agree that the record reflects my personal performance and is accurate and complete.               Scribe Attestation:   Scribe #1: I performed the above scribed service and the documentation accurately describes the services I performed. I attest to the accuracy of the note.        ED Course as of 09/28/23 0122   Sun Aug 27, 2023   0001 Pertinent lab findings include:  Mild hyponatremia sodium 135, elevated urine SG  D-dimer elevated 0.71 [DL]      ED Course User Index  [DL] Sepideh Mcdonough MD               Medical Decision Making:   Differential Diagnosis:   Stroke, seizure, ICH,  dysrhythmia, hypoglycemia, PE, heart failure, hypovolemia, sepsis, symptomatic anemia, toxicity, others  Clinical Tests:   Lab Tests: Ordered and Reviewed  Radiological Study: Ordered and Reviewed  Medical Tests: Ordered and Reviewed  ED Management:  CT head without contrast and CTA chest negative for acute abnormality. Labs suggest dehydration. NS 1L given in ED.  Based on Haitian syncope rule, patient is low risk and well appearing here, plan to discharge the patient home with  PCP follow up.    Vitals:    08/27/23 0158 08/27/23 0200 08/27/23 0202 08/27/23 0229   BP: 128/86 (!) 130/94 (!) 138/97    Patient Position: Sitting Standing     Pulse: 73 80 68    Resp: 14 17 19    Temp:    97.2 °F (36.2 °C)   TempSrc:       SpO2: 100% 100% 100%    Weight:       Height:             Clinical Impression:   Final diagnoses:  [R06.02] SOB (shortness of breath)  [R07.9] Chest pain  [R07.89] Chest wall pain  [R55] Syncope, unspecified syncope type (Primary)  [R07.9] Chest pain, unspecified type  [R91.8] Multiple pulmonary nodules        ED Disposition Condition    Discharge Stable          ED Prescriptions    None       Follow-up Information       Follow up With Specialties Details Why Contact Info    The nearest emergency department.  Go to  As needed, If symptoms worsen     Your PCP  Call on 8/28/2023 to schedule an appointment, for re-evaluation of today's complaint, and ongoing care              Sepideh Mcdonough MD  09/28/23 0141

## 2023-08-27 NOTE — DISCHARGE INSTRUCTIONS
Drink plenty of electrolyte-rich fluids (at least one gallon or more daily).  Limit/avoid caffeine (such as coffee, tea, Coke, Coke Zero, Pepsi, Root Beer, Dr .Pepper, Mountain Dew, energy drinks, pre-workout supplements, and many others.) and alcohol intake while symptoms persist.    Contact your primary care doctor to repeat CT scan of the chest in 3-6 months to monitor pulmonary nodules.

## 2023-08-27 NOTE — ED TRIAGE NOTES
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  Reports feeling cp, chest pain and tightness.  Denies n/v.  Denies medical history.  Did not get prescriptions filled from prior ed visit.

## 2025-07-13 ENCOUNTER — HOSPITAL ENCOUNTER (EMERGENCY)
Facility: HOSPITAL | Age: 40
Discharge: HOME | End: 2025-07-13
Attending: STUDENT IN AN ORGANIZED HEALTH CARE EDUCATION/TRAINING PROGRAM
Payer: COMMERCIAL

## 2025-07-13 VITALS
DIASTOLIC BLOOD PRESSURE: 102 MMHG | TEMPERATURE: 98 F | RESPIRATION RATE: 21 BRPM | HEART RATE: 71 BPM | HEIGHT: 72 IN | OXYGEN SATURATION: 100 % | SYSTOLIC BLOOD PRESSURE: 163 MMHG | BODY MASS INDEX: 27.77 KG/M2 | WEIGHT: 205 LBS

## 2025-07-13 DIAGNOSIS — E03.8 SUBCLINICAL HYPOTHYROIDISM: ICD-10-CM

## 2025-07-13 DIAGNOSIS — G44.311 INTRACTABLE ACUTE POST-TRAUMATIC HEADACHE: Primary | ICD-10-CM

## 2025-07-13 DIAGNOSIS — R06.02 SHORTNESS OF BREATH: ICD-10-CM

## 2025-07-13 DIAGNOSIS — R55 SYNCOPE: ICD-10-CM

## 2025-07-13 LAB
ABSOLUTE EOSINOPHIL (OHS): 0.39 K/UL
ABSOLUTE MONOCYTE (OHS): 0.86 K/UL (ref 0.3–1)
ABSOLUTE NEUTROPHIL COUNT (OHS): 5.29 K/UL (ref 1.8–7.7)
ALBUMIN SERPL BCP-MCNC: 4 G/DL (ref 3.5–5.2)
ALP SERPL-CCNC: 65 UNIT/L (ref 40–150)
ALT SERPL W/O P-5'-P-CCNC: 12 UNIT/L (ref 10–44)
AMPHET UR QL SCN: ABNORMAL
ANION GAP (OHS): 15 MMOL/L (ref 8–16)
AST SERPL-CCNC: 20 UNIT/L (ref 11–45)
BARBITURATE SCN PRESENT UR: NEGATIVE
BASOPHILS # BLD AUTO: 0.06 K/UL
BASOPHILS NFR BLD AUTO: 0.6 %
BENZODIAZ UR QL SCN: NEGATIVE
BILIRUB SERPL-MCNC: 0.5 MG/DL (ref 0.1–1)
BILIRUB UR QL STRIP.AUTO: NEGATIVE
BNP SERPL-MCNC: <10 PG/ML (ref 0–99)
BUN SERPL-MCNC: 17 MG/DL (ref 6–20)
CALCIUM SERPL-MCNC: 9.3 MG/DL (ref 8.7–10.5)
CANNABINOIDS UR QL SCN: ABNORMAL
CHLORIDE SERPL-SCNC: 106 MMOL/L (ref 95–110)
CLARITY UR: CLEAR
CO2 SERPL-SCNC: 20 MMOL/L (ref 23–29)
COCAINE UR QL SCN: NEGATIVE
COLOR UR AUTO: YELLOW
CREAT SERPL-MCNC: 1.1 MG/DL (ref 0.5–1.4)
CREAT UR-MCNC: >450 MG/DL (ref 23–375)
D DIMER PPP IA.FEU-MCNC: 0.34 MG/L FEU
ERYTHROCYTE [DISTWIDTH] IN BLOOD BY AUTOMATED COUNT: 14.1 % (ref 11.5–14.5)
GFR SERPLBLD CREATININE-BSD FMLA CKD-EPI: >60 ML/MIN/1.73/M2
GLUCOSE SERPL-MCNC: 87 MG/DL (ref 70–110)
GLUCOSE UR QL STRIP: NEGATIVE
HCT VFR BLD AUTO: 45.6 % (ref 40–54)
HGB BLD-MCNC: 14.9 GM/DL (ref 14–18)
HGB UR QL STRIP: NEGATIVE
IMM GRANULOCYTES # BLD AUTO: 0.02 K/UL (ref 0–0.04)
IMM GRANULOCYTES NFR BLD AUTO: 0.2 % (ref 0–0.5)
INR PPP: 1 (ref 0.8–1.2)
KETONES UR QL STRIP: NEGATIVE
LEUKOCYTE ESTERASE UR QL STRIP: NEGATIVE
LYMPHOCYTES # BLD AUTO: 2.7 K/UL (ref 1–4.8)
MAGNESIUM SERPL-MCNC: 2.2 MG/DL (ref 1.6–2.6)
MCH RBC QN AUTO: 28.2 PG (ref 27–31)
MCHC RBC AUTO-ENTMCNC: 32.7 G/DL (ref 32–36)
MCV RBC AUTO: 86 FL (ref 82–98)
METHADONE UR QL SCN: NEGATIVE
NITRITE UR QL STRIP: NEGATIVE
NUCLEATED RBC (/100WBC) (OHS): 0 /100 WBC
OPIATES UR QL SCN: NEGATIVE
PCP UR QL: NEGATIVE
PH UR STRIP: 6 [PH]
PLATELET # BLD AUTO: 319 K/UL (ref 150–450)
PMV BLD AUTO: 10.1 FL (ref 9.2–12.9)
POCT GLUCOSE: 81 MG/DL (ref 70–110)
POTASSIUM SERPL-SCNC: 3.7 MMOL/L (ref 3.5–5.1)
PROT SERPL-MCNC: 7.9 GM/DL (ref 6–8.4)
PROT UR QL STRIP: ABNORMAL
PROTHROMBIN TIME: 11 SECONDS (ref 9–12.5)
RBC # BLD AUTO: 5.28 M/UL (ref 4.6–6.2)
RELATIVE EOSINOPHIL (OHS): 4.2 %
RELATIVE LYMPHOCYTE (OHS): 29 % (ref 18–48)
RELATIVE MONOCYTE (OHS): 9.2 % (ref 4–15)
RELATIVE NEUTROPHIL (OHS): 56.8 % (ref 38–73)
SODIUM SERPL-SCNC: 141 MMOL/L (ref 136–145)
SP GR UR STRIP: >=1.03
T4 FREE SERPL-MCNC: 0.7 NG/DL (ref 0.71–1.51)
TROPONIN I SERPL DL<=0.01 NG/ML-MCNC: <0.006 NG/ML
TSH SERPL-ACNC: 0.33 UIU/ML (ref 0.4–4)
UROBILINOGEN UR STRIP-ACNC: NEGATIVE EU/DL
WBC # BLD AUTO: 9.32 K/UL (ref 3.9–12.7)

## 2025-07-13 PROCEDURE — 81003 URINALYSIS AUTO W/O SCOPE: CPT | Mod: 59 | Performed by: STUDENT IN AN ORGANIZED HEALTH CARE EDUCATION/TRAINING PROGRAM

## 2025-07-13 PROCEDURE — 85025 COMPLETE CBC W/AUTO DIFF WBC: CPT | Performed by: STUDENT IN AN ORGANIZED HEALTH CARE EDUCATION/TRAINING PROGRAM

## 2025-07-13 PROCEDURE — 85610 PROTHROMBIN TIME: CPT | Performed by: STUDENT IN AN ORGANIZED HEALTH CARE EDUCATION/TRAINING PROGRAM

## 2025-07-13 PROCEDURE — 84484 ASSAY OF TROPONIN QUANT: CPT | Performed by: STUDENT IN AN ORGANIZED HEALTH CARE EDUCATION/TRAINING PROGRAM

## 2025-07-13 PROCEDURE — 25000003 PHARM REV CODE 250: Performed by: STUDENT IN AN ORGANIZED HEALTH CARE EDUCATION/TRAINING PROGRAM

## 2025-07-13 PROCEDURE — 83880 ASSAY OF NATRIURETIC PEPTIDE: CPT | Performed by: STUDENT IN AN ORGANIZED HEALTH CARE EDUCATION/TRAINING PROGRAM

## 2025-07-13 PROCEDURE — 99285 EMERGENCY DEPT VISIT HI MDM: CPT | Mod: 25

## 2025-07-13 PROCEDURE — 80053 COMPREHEN METABOLIC PANEL: CPT | Performed by: STUDENT IN AN ORGANIZED HEALTH CARE EDUCATION/TRAINING PROGRAM

## 2025-07-13 PROCEDURE — 85379 FIBRIN DEGRADATION QUANT: CPT | Performed by: STUDENT IN AN ORGANIZED HEALTH CARE EDUCATION/TRAINING PROGRAM

## 2025-07-13 PROCEDURE — 82962 GLUCOSE BLOOD TEST: CPT

## 2025-07-13 PROCEDURE — 80307 DRUG TEST PRSMV CHEM ANLYZR: CPT | Performed by: STUDENT IN AN ORGANIZED HEALTH CARE EDUCATION/TRAINING PROGRAM

## 2025-07-13 PROCEDURE — 93010 ELECTROCARDIOGRAM REPORT: CPT | Mod: ,,, | Performed by: INTERNAL MEDICINE

## 2025-07-13 PROCEDURE — 84439 ASSAY OF FREE THYROXINE: CPT | Performed by: STUDENT IN AN ORGANIZED HEALTH CARE EDUCATION/TRAINING PROGRAM

## 2025-07-13 PROCEDURE — 83735 ASSAY OF MAGNESIUM: CPT | Performed by: STUDENT IN AN ORGANIZED HEALTH CARE EDUCATION/TRAINING PROGRAM

## 2025-07-13 PROCEDURE — 93005 ELECTROCARDIOGRAM TRACING: CPT

## 2025-07-13 RX ORDER — BUTALBITAL, ACETAMINOPHEN AND CAFFEINE 50; 325; 40 MG/1; MG/1; MG/1
2 TABLET ORAL
Status: COMPLETED | OUTPATIENT
Start: 2025-07-13 | End: 2025-07-13

## 2025-07-13 RX ADMIN — BUTALBITAL, ACETAMINOPHEN, AND CAFFEINE 2 TABLET: 325; 50; 40 TABLET ORAL at 07:07

## 2025-07-13 NOTE — ED NOTES
"Johan Kaufman, a 40 y.o. male presents to the ED w/ complaint of dizziness and headache r/t a syncopal episode today while walking to work. Pt report feeling hitting left side of head when falling to the sidewalk. Pt has experienced similar symptoms "off and on for a couple of years". Patient is AAOx3, VSS, NAD. Denies CP, SOB, N/V/D/C, cough, fever, numbness, or tingling. Bed locked, in lowest position, bed rails up x2, call light in reach, all monitoring attached.    Triage note:  Chief Complaint   Patient presents with    Loss of Consciousness     Pt c/o feeling dizzy and light-headed on/off x 2 years.  Today while walking pt had same dizziness and light-headedness along w/ SOB when he had a syncopal episode. Pt c/o left temple pain where he believes he hit his head.  Denies blood thinners. Denies neck pain, nasal congestion or ear pain.  Pt only c/o "a little" dizzy at this time.  MD called to R/O stroke     Review of patient's allergies indicates:  No Known Allergies  Past Medical History:   Diagnosis Date    ADHD        "

## 2025-07-13 NOTE — ED PROVIDER NOTES
"Encounter Date: 7/13/2025       History     Chief Complaint   Patient presents with    Loss of Consciousness     Pt c/o feeling dizzy and light-headed on/off x 2 years.  Today while walking pt had same dizziness and light-headedness along w/ SOB when he had a syncopal episode. Pt c/o left temple pain where he believes he hit his head.  Denies blood thinners. Denies neck pain, nasal congestion or ear pain.  Pt only c/o "a little" dizzy at this time.  MD called to R/O stroke     40 y.o. male who has a past medical history of ADHD. presents to the emergency department due to  dizziness and syncopal episode.  Patient reports having recurrent episodes of dizziness and syncope ongoing for the past 2 years.  He states he was previously admitted in the hospital for similar issue and they were not able to figure out the cause of his recurrent syncopal/dizziness episodes.  He reports today at 3:00 p.m. he was walking into a store when he began feeling dizzy has shortness of breath and syncopized.  He reports prior to today having a syncopal episode at work 1 week ago.  He reports he was cooking at work and was really hot and next thing he remembers was being found on the ground.  He reports occasional cigarette use denies alcohol or recreational drugs.  Currently reports feeling mildly dizzy as well as having left temporal headache which she attributes to his fall.  Denies any visual disturbances numbness or weakness.  Denies any problems with his gait.  No medications taken prior to ED presentation.    The history is provided by the patient.     Review of patient's allergies indicates:  No Known Allergies  Past Medical History:   Diagnosis Date    ADHD      History reviewed. No pertinent surgical history.  Family History   Problem Relation Name Age of Onset    COPD Neg Hx      Drug abuse Neg Hx      Hyperlipidemia Neg Hx      Intellectual disability Neg Hx       Social History[1]  Review of Systems   Constitutional:  " Negative for chills and fever.   HENT:  Negative for congestion and rhinorrhea.    Eyes:  Negative for pain.   Respiratory:  Negative for cough and shortness of breath.    Cardiovascular:  Negative for chest pain and leg swelling.   Gastrointestinal:  Negative for abdominal pain, nausea and vomiting.   Genitourinary:  Negative for dysuria and hematuria.   Musculoskeletal:  Negative for joint swelling and neck pain.   Skin:  Negative for rash.   Neurological:  Positive for dizziness, syncope and headaches. Negative for weakness.       Physical Exam     Initial Vitals [07/13/25 1715]   BP Pulse Resp Temp SpO2   (!) 137/96 95 18 98 °F (36.7 °C) 99 %      MAP       --         Physical Exam    Constitutional: He is not diaphoretic. No distress.   HENT:   Head: Normocephalic and atraumatic.   Eyes: Conjunctivae and EOM are normal. Pupils are equal, round, and reactive to light.   Neck:   Normal range of motion.  Cardiovascular:  Regular rhythm.           Pulses:       Radial pulses are 2+ on the right side and 2+ on the left side.        Posterior tibial pulses are 2+ on the right side and 2+ on the left side.   Pulmonary/Chest: Breath sounds normal. No respiratory distress.   Abdominal: Abdomen is soft. Bowel sounds are normal. He exhibits no distension. There is no abdominal tenderness.   Musculoskeletal:         General: No tenderness. Normal range of motion.      Cervical back: Normal range of motion.     Neurological: He is alert and oriented to person, place, and time.   Moves all extremities and carries on conversation. CN- II: PERRL; III/IV/VI: EOMI w/out evidence of nystagmus; V: no deficits appreciated to light touch bilateral face; VII: no facial weakness, no facial asymmetry. Eyebrow raise symmetric. Smile symmetric; IX/X: palate midline, and raises symmetrically; XI: shoulder shrug 5/5 bilaterally; XII: tongue is midline w/out asymmetry. Strength 5/5 to bilateral upper and lower extremities, sensation intact  to light touch.     Skin: Skin is warm. Capillary refill takes less than 2 seconds.   Psychiatric: His behavior is normal.         ED Course   Procedures  Labs Reviewed   COMPREHENSIVE METABOLIC PANEL - Abnormal       Result Value    Sodium 141      Potassium 3.7      Chloride 106      CO2 20 (*)     Glucose 87      BUN 17      Creatinine 1.1      Calcium 9.3      Protein Total 7.9      Albumin 4.0      Bilirubin Total 0.5      ALP 65      AST 20      ALT 12      Anion Gap 15      eGFR >60     TSH - Abnormal    TSH 0.325 (*)     Free T4 0.70 (*)    URINALYSIS, REFLEX TO URINE CULTURE - Abnormal    Color, UA Yellow      Appearance, UA Clear      pH, UA 6.0      Spec Grav UA >=1.030 (*)     Protein, UA Trace (*)     Glucose, UA Negative      Ketones, UA Negative      Bilirubin, UA Negative      Blood, UA Negative      Nitrites, UA Negative      Urobilinogen, UA Negative      Leukocyte Esterase, UA Negative     MAGNESIUM - Normal    Magnesium  2.2     TROPONIN I - Normal    Troponin-I <0.006     B-TYPE NATRIURETIC PEPTIDE - Normal    BNP <10     PROTIME-INR - Normal    PT 11.0      INR 1.0     D DIMER, QUANTITATIVE - Normal    D-Dimer 0.34     CBC WITH DIFFERENTIAL - Normal    WBC 9.32      RBC 5.28      HGB 14.9      HCT 45.6      MCV 86      MCH 28.2      MCHC 32.7      RDW 14.1      Platelet Count 319      MPV 10.1      Nucleated RBC 0      Neut % 56.8      Lymph % 29.0      Mono % 9.2      Eos % 4.2      Basophil % 0.6      Imm Grans % 0.2      Neut # 5.29      Lymph # 2.70      Mono # 0.86      Eos # 0.39      Baso # 0.06      Imm Grans # 0.02     CBC W/ AUTO DIFFERENTIAL    Narrative:     The following orders were created for panel order CBC Auto Differential.  Procedure                               Abnormality         Status                     ---------                               -----------         ------                     CBC with Differential[[id number]]       Normal              Final result                  Please view results for these tests on the individual orders.   DRUG SCREEN PANEL, URINE EMERGENCY   GREY TOP URINE HOLD   POCT GLUCOSE    POCT Glucose 81            Imaging Results              CT Head Without Contrast (Final result)  Result time 07/13/25 18:02:29      Final result by Saman Law MD (07/13/25 18:02:29)                   Impression:      No acute intracranial abnormalities identified.      Electronically signed by: Saman Law MD  Date:    07/13/2025  Time:    18:02               Narrative:    EXAMINATION:  CT HEAD WITHOUT CONTRAST    CLINICAL HISTORY:  Syncope, recurrent;Polytrauma, blunt;    TECHNIQUE:  Low dose axial images were obtained through the head.  Coronal and sagittal reformations were also performed. Contrast was not administered.    COMPARISON:  CT head August 2023.    FINDINGS:  No evidence of acute/recent major vascular distribution cerebral infarction, intraparenchymal hemorrhage, or intra-axial space occupying lesion. The ventricular system is normal in size and configuration with no evidence of hydrocephalus. No effacement of the skull-base cisterns. No abnormal extra-axial fluid collections or blood products. Visualized paranasal sinuses and mastoid air cells are clear. The calvarium shows no significant abnormality.                                       CT Cervical Spine Without Contrast (Final result)  Result time 07/13/25 18:05:40      Final result by Saman Law MD (07/13/25 18:05:40)                   Impression:      No evidence of acute cervical spine fracture or dislocation.      Electronically signed by: Saman Law MD  Date:    07/13/2025  Time:    18:05               Narrative:    EXAMINATION:  CT CERVICAL SPINE WITHOUT CONTRAST    CLINICAL HISTORY:  Polytrauma, blunt;    TECHNIQUE:  Low dose axial images, sagittal and coronal reformations were performed though the cervical spine.  Contrast was not  administered.    COMPARISON:  None    FINDINGS:  No evidence of acute cervical spine fracture or dislocation.  Odontoid process is intact.  Craniocervical junction is unremarkable.  Cervical spine alignment is within normal limits.    Surrounding soft tissues show no significant abnormalities.  Airway is patent.  Partially visualized lung apices are clear.                                       X-Ray Chest AP Portable (Final result)  Result time 07/13/25 17:43:28      Final result by Saman Law MD (07/13/25 17:43:28)                   Impression:      No acute cardiopulmonary process identified.      Electronically signed by: Saman Law MD  Date:    07/13/2025  Time:    17:43               Narrative:    EXAMINATION:  XR CHEST AP PORTABLE    CLINICAL HISTORY:  Shortness of breath    TECHNIQUE:  Single frontal view of the chest was performed.    COMPARISON:  August 2023.    FINDINGS:  Cardiac silhouette is normal in size.  Lungs are symmetrically expanded.  No evidence of focal consolidative process, pneumothorax, or significant pleural effusion.  No acute osseous abnormality identified.                                       Medications   butalbital-acetaminophen-caffeine -40 mg per tablet 2 tablet (2 tablets Oral Given 7/13/25 1953)     Medical Decision Making:   Initial Assessment:   40 y.o. male who has a past medical history of ADHD. presents to the emergency department due to  dizziness and syncopal episode.  Patient reports having recurrent episodes of dizziness and syncope ongoing for the past 2 years.  Patient in no acute distress.  Vital signs notable for mildly elevated blood pressure otherwise unremarkable.  No focal neurological deficits on exam.  GCS 15 no signs of obvious head trauma.      DDx:   Seizure: no witnessed seizure activity, incontinence or h/o seizures to suggest seizure today  Hypoxia and hypoglycemia less likely in this patient with normal sats and BG of 81  Cardiac issue:  electrical (dysarrhythmias, brugada, WPW, long QT).  Less likely given no evidence of drop attack, no palpitations, no EKG findings to predispose to arrhythmias. No CP, no STEMI on EKG; NSTEMI unlikely to cause brief cardiogenic shock in absence of other significant findings, so likely does not need full cardiac rule out with troponins and stress.  Mechanical: outflow obstruction like HOCM or aortic stenosis, tamponade-less likely as no murmur, non-exertional, no hypertrophy on EKG.   Vessels: PE, dissection, AAA.  Clinical picture inconsistent, no abd pain, no pulsatile mass, symmetric pulses, no risk factors of PE  Volume issue: dehydration from vomiting/diarrhea/decreased PO, sepsis, GI bleed, ruptured ectopic or bleeding AAA. No signs of recent illness to suggest infection, no e/o anemia by history or PE  Neuro: No HA, no personal or family h/o cerebral aneurysm, nml neuro exam, so this is unlikely ICH or sentinel bleed  Also: vasovagal, drugs/meds, autonomic insufficiency.   Differential Diagnosis:   Differential Diagnosis includes, but is not limited to:  Arrhythmia, aortic dissection, MI/unstable angina, PE, cardiogenic shock, CHF, CVA/TIA, intracranial lesion/mass, seizure, perforated viscous, ruptured AAA, orthostatic hypotension, vasovagal episode, anemia, dehydration, medication reaction, intentional overdose   Clinical Tests:   Lab Tests: Ordered and Reviewed  Radiological Study: Ordered and Reviewed  Medical Tests: Ordered and Reviewed             ED Course as of 07/13/25 2137   Sun Jul 13, 2025 1814 CBC Auto Differential  CBC reviewed and interpreted by me:   No significant leukocytosis, anemia (at baseline), or platelet abnormalities.   [AS]   1815 CT Cervical Spine Without Contrast [AS]   1815 CT Head Without Contrast [AS]   1818 X-Ray Chest AP Portable [AS]   1851 D-Dimer, Quantitative [AS]   1851 Troponin I [AS]   1851 BNP [AS]   1921 TSH(!)  TSH mildly low.  Free T4 on the lower end of normal.   Will have patient follow-up as outpatient with endocrinology. [AS]   2132 Pt is currently stable for discharge. I see no indication of an emergent process beyond that addressed during our encounter but have duly counseled the patient/family regarding the need for prompt follow-up as well as the indications that should prompt immediate return to the emergency room should new or worrisome developments occur. I discussed the ED work up and diagnostic findings with the patient/family. The patient/family has been provided with verbal and printed direction regarding our final diagnosis(es) as well as instructions regarding use of OTC and/or Rx medications intended to manage the patient's aforementioned conditions. The patient/family verbalized an understanding. The patient/family is asked if there are any questions or concerns. We discuss the case, until all issues are addressed to the patient/family's satisfaction. Patient/family understands and is agreeable to the plan.  [AS]      ED Course User Index  [AS] Donovan Ritchie MD          Medical Decision Making  Amount and/or Complexity of Data Reviewed  Labs: ordered. Decision-making details documented in ED Course.  Radiology: ordered. Decision-making details documented in ED Course.    Risk  Prescription drug management.         Critical Care Procedure Note  Authorized and Performed by: Donovan Ritchie MD  Total critical care time: 35 minutes  Due to a high probability of clinically significant, life threatening deterioration, the patient required my highest level of preparedness to intervene emergently and I personally spent this critical care time directly and personally managing the patient. This critical care time included obtaining a history; examining the patient; pulse oximetry; ordering and review of studies; arranging urgent treatment with development of a management plan; evaluation of patient's response to treatment; frequent reassessment; and, discussions  with other providers.  This critical care time was performed to assess and manage the high probability of imminent, life-threatening deterioration that could result in multi-organ failure. It was exclusive of separately billable procedures and treating other patients and teaching time.  Please see MDM section and the rest of the note for further information on patient assessment and treatment.   Clinical Impression:   Final diagnoses:  [R55] Syncope  [R06.02] Shortness of breath  [G44.311] Intractable acute post-traumatic headache (Primary)  [E03.8] Subclinical hypothyroidism          ED Disposition Condition    Discharge Stable          ED Prescriptions    None       Follow-up Information       Follow up With Specialties Details Why Contact Info    [facility]  Schedule an appointment as soon as possible for a visit  for reassesment [address] [facility] [address]  [phone]      [facility] Emergency Medicine  If symptoms worsen [address] [facility] [place] [address] [phone]            DISCLAIMER: This note was prepared with [employer] voice recognition transcription software. Garbled syntax, mangled pronouns, and other bizarre constructions may be attributed to that software system.         [1]   Social History  Tobacco Use    Smoking status: Some Days     Current packs/day: 0.25     Types: Cigarettes    Smokeless tobacco: Never    Tobacco comments:     two cigaretts a day   Substance Use Topics    Alcohol use: Yes     Comment: rarely    Drug use: Yes     Types: Marijuana     Comment: rarely        Donovan Ritchie MD  07/13/25 [id number]

## 2025-07-14 LAB — HOLD SPECIMEN: NORMAL

## 2025-07-14 NOTE — DISCHARGE INSTRUCTIONS
Thank you for coming to our Emergency Department today. It is important to remember that some problems are difficult to diagnose and may not be found during your first visit. Be sure to follow up with your primary care doctor and review any labs/imaging that was performed with them. If you do not have a primary care doctor, you may contact the one listed on your discharge paperwork or you may also call the [facility] Appointment Desk at [phone] to schedule an appointment with one.     All medications may potentially have side effects and it is impossible to predict which medications may give you side effects. If you feel that you are having a negative effect of any medication you should immediately stop taking them and seek medical attention.    Return to the ER with any questions/concerns, new/concerning symptoms, worsening or failure to improve. Do not drive or make any important decisions for 24 hours if you have received any pain medications, sedatives or mood altering drugs during your ER visit.

## 2025-07-14 NOTE — ED NOTES
Received report from ANGELICA Gandhi. Introduced self at bedside, reports 9/10 pain , MD notified.  Pt is alert, calm, and oriented x4, no acute distress noted.

## 2025-07-15 LAB
OHS QRS DURATION: 96 MS
OHS QTC CALCULATION: 429 MS